# Patient Record
Sex: FEMALE | Race: WHITE
[De-identification: names, ages, dates, MRNs, and addresses within clinical notes are randomized per-mention and may not be internally consistent; named-entity substitution may affect disease eponyms.]

---

## 2017-08-17 ENCOUNTER — HOSPITAL ENCOUNTER (OUTPATIENT)
Dept: HOSPITAL 63 - ER | Age: 47
Setting detail: OBSERVATION
LOS: 1 days | Discharge: HOME | End: 2017-08-18
Attending: INTERNAL MEDICINE | Admitting: INTERNAL MEDICINE
Payer: OTHER GOVERNMENT

## 2017-08-17 VITALS — BODY MASS INDEX: 28.57 KG/M2 | HEIGHT: 68 IN | WEIGHT: 188.5 LBS

## 2017-08-17 VITALS — DIASTOLIC BLOOD PRESSURE: 79 MMHG | SYSTOLIC BLOOD PRESSURE: 119 MMHG

## 2017-08-17 VITALS — SYSTOLIC BLOOD PRESSURE: 120 MMHG | DIASTOLIC BLOOD PRESSURE: 74 MMHG

## 2017-08-17 DIAGNOSIS — M54.2: ICD-10-CM

## 2017-08-17 DIAGNOSIS — M79.7: ICD-10-CM

## 2017-08-17 DIAGNOSIS — K58.9: ICD-10-CM

## 2017-08-17 DIAGNOSIS — F32.9: ICD-10-CM

## 2017-08-17 DIAGNOSIS — E66.9: ICD-10-CM

## 2017-08-17 DIAGNOSIS — G44.209: ICD-10-CM

## 2017-08-17 DIAGNOSIS — M54.5: ICD-10-CM

## 2017-08-17 DIAGNOSIS — F43.10: ICD-10-CM

## 2017-08-17 DIAGNOSIS — F41.9: ICD-10-CM

## 2017-08-17 DIAGNOSIS — G89.4: ICD-10-CM

## 2017-08-17 DIAGNOSIS — K21.9: ICD-10-CM

## 2017-08-17 DIAGNOSIS — G43.909: ICD-10-CM

## 2017-08-17 DIAGNOSIS — F17.210: ICD-10-CM

## 2017-08-17 DIAGNOSIS — G40.89: Primary | ICD-10-CM

## 2017-08-17 DIAGNOSIS — Z82.49: ICD-10-CM

## 2017-08-17 LAB
AMPHETAMINE/METHAMPHETAMINE: (no result)
ANION GAP SERPL CALC-SCNC: 16 MMOL/L (ref 6–14)
BARBITURATES UR-MCNC: (no result) UG/ML
BASOPHILS # BLD AUTO: 0.1 X10^3/UL (ref 0–0.2)
BASOPHILS NFR BLD: 1 % (ref 0–3)
BENZODIAZ UR-MCNC: (no result) UG/L
CA-I SERPL ISE-MCNC: 12 MG/DL (ref 7–20)
CALCIUM SERPL-MCNC: 9 MG/DL (ref 8.5–10.1)
CANNABINOIDS UR-MCNC: (no result) UG/L
CHLORIDE SERPL-SCNC: 107 MMOL/L (ref 98–107)
CO2 SERPL-SCNC: 18 MMOL/L (ref 21–32)
COCAINE UR-MCNC: (no result) NG/ML
CREAT SERPL-MCNC: 1.2 MG/DL (ref 0.6–1)
EOSINOPHIL NFR BLD: 0.1 X10^3/UL (ref 0–0.7)
EOSINOPHIL NFR BLD: 1 % (ref 0–3)
ERYTHROCYTE [DISTWIDTH] IN BLOOD BY AUTOMATED COUNT: 14.2 % (ref 11.5–14.5)
GFR SERPLBLD BASED ON 1.73 SQ M-ARVRAT: 48.2 ML/MIN
GLUCOSE SERPL-MCNC: 120 MG/DL (ref 70–99)
HCT VFR BLD CALC: 42.5 % (ref 36–47)
HGB BLD-MCNC: 14.3 G/DL (ref 12–15.5)
LYMPHOCYTES # BLD: 1.6 X10^3/UL (ref 1–4.8)
LYMPHOCYTES NFR BLD AUTO: 16 % (ref 24–48)
MCH RBC QN AUTO: 30 PG (ref 25–35)
MCHC RBC AUTO-ENTMCNC: 34 G/DL (ref 31–37)
MCV RBC AUTO: 90 FL (ref 79–100)
METHADONE SERPL-MCNC: (no result) NG/ML
MONO #: 0.3 X10^3/UL (ref 0–1.1)
MONOCYTES NFR BLD: 3 % (ref 0–9)
NEUT #: 8.3 X10^3UL (ref 1.8–7.7)
NEUTROPHILS NFR BLD AUTO: 80 % (ref 31–73)
OPIATES UR-MCNC: (no result) NG/ML
PCP SERPL-MCNC: (no result) MG/DL
PLATELET # BLD AUTO: 153 X10^3/UL (ref 140–400)
POTASSIUM SERPL-SCNC: 3.6 MMOL/L (ref 3.5–5.1)
PREG TEST PT QUAL: NEGATIVE
RBC # BLD AUTO: 4.71 X10^6/UL (ref 3.5–5.4)
SODIUM SERPL-SCNC: 141 MMOL/L (ref 136–145)
WBC # BLD AUTO: 10.5 X10^3/UL (ref 4–11)

## 2017-08-17 PROCEDURE — 70450 CT HEAD/BRAIN W/O DYE: CPT

## 2017-08-17 PROCEDURE — 80048 BASIC METABOLIC PNL TOTAL CA: CPT

## 2017-08-17 PROCEDURE — 81025 URINE PREGNANCY TEST: CPT

## 2017-08-17 PROCEDURE — 96361 HYDRATE IV INFUSION ADD-ON: CPT

## 2017-08-17 PROCEDURE — G0378 HOSPITAL OBSERVATION PER HR: HCPCS

## 2017-08-17 PROCEDURE — 85027 COMPLETE CBC AUTOMATED: CPT

## 2017-08-17 PROCEDURE — G0479 DRUG TEST PRESUMP NOT OPT: HCPCS

## 2017-08-17 PROCEDURE — 95816 EEG AWAKE AND DROWSY: CPT

## 2017-08-17 PROCEDURE — 72125 CT NECK SPINE W/O DYE: CPT

## 2017-08-17 PROCEDURE — 96374 THER/PROPH/DIAG INJ IV PUSH: CPT

## 2017-08-17 PROCEDURE — 96372 THER/PROPH/DIAG INJ SC/IM: CPT

## 2017-08-17 PROCEDURE — 96375 TX/PRO/DX INJ NEW DRUG ADDON: CPT

## 2017-08-17 PROCEDURE — 85025 COMPLETE CBC W/AUTO DIFF WBC: CPT

## 2017-08-17 PROCEDURE — 93005 ELECTROCARDIOGRAM TRACING: CPT

## 2017-08-17 PROCEDURE — G0379 DIRECT REFER HOSPITAL OBSERV: HCPCS

## 2017-08-17 PROCEDURE — 36415 COLL VENOUS BLD VENIPUNCTURE: CPT

## 2017-08-17 PROCEDURE — 84703 CHORIONIC GONADOTROPIN ASSAY: CPT

## 2017-08-17 PROCEDURE — 80307 DRUG TEST PRSMV CHEM ANLYZR: CPT

## 2017-08-17 RX ADMIN — SODIUM CHLORIDE, PRESERVATIVE FREE PRN ML: 5 INJECTION INTRAVENOUS at 15:02

## 2017-08-17 RX ADMIN — SODIUM CHLORIDE, PRESERVATIVE FREE PRN ML: 5 INJECTION INTRAVENOUS at 20:05

## 2017-08-17 RX ADMIN — SODIUM CHLORIDE SCH MLS/HR: 0.9 INJECTION, SOLUTION INTRAVENOUS at 20:04

## 2017-08-17 NOTE — PHYS DOC
Past History


Past Medical History:  Fibromyalgia, IBS, Migraines


Past Surgical History:  Appendectomy, Cholecystectomy, Hysterectomy, 

Tonsillectomy, Other


Additional Past Surgical Histo:  foot surgery


Smoking:  Non-smoker


Alcohol Use:  None


Drug Use:  None





Adult General


Chief Complaint


Chief Complaint:  seizure-like motor activity





[facility]


HPI





Patient is a pleasant 46-year-old female with history of chronic lower back 

pain significant PTSD, fibromyalgia who is presently under the care of chronic 

pain management clinic doctor Dr. Poole for several years secondary to chronic 

lower back pain and headache. She's been transferred to his care by Dr. Timoteo Quinonez for an evaluation and management of her chronic back pain. Patient is a 

chronic lower back pain radiates bilateral hips and lower legs pain is 

described as sharp stabbing and throbbing pain is moderate to severe. Patient 

says that the pain came on gradually has been worse with bending twisting and 

prolonged sitting. It does affect her sleep in quality didn't see. In the past 

she has failed therapeutic doses and regimens of appropriate gabapentin, 

Cymbalta over-the-counter anti-inflammatories and opiates. Head interim thecal 

injections massage therapy and stretching exercises. Patient also complains of 

headaches which are bilateral and she is taking Topamax for her chronic 

migraines. Patient is failed prescription strength analgesics membranes it was 

a muscle relaxant and NSAIDs. Today she was going to Dr. Quinonez's office for an 

evaluation when she fell this morning after having a questionable seizure after 

the fall. Patient has had total of 10 of these tonic-clonic movement activities 

lasting 60 seconds anywhere from 2 minutes and longer. Patient is not postictal 

she is able to talk through the entire exam she will follow instructions even 

while examining the symptoms. She is GCS 15 after the symptoms seen. The 

prescription is rolling back and forth eyes will be closed and pain would have 

pelvic thrusting as well as her stay with her seizure-like activity. Patient 

denies a prior history of seizure-like activity.





Review of Systems


Review of Systems





Constitutional: Denies fever or chills []


Eyes: Denies change in visual acuity, redness, or eye pain []


HENT: Denies nasal congestion or sore throat []


Respiratory: Denies cough or shortness of breath []


Cardiovascular: No additional information not addressed in HPI []


GI: Denies abdominal pain, nausea, vomiting, bloody stools or diarrhea []


: Denies dysuria or hematuria []


Musculoskeletal: She does have chronic headaches


Integument: Denies rash or skin lesions []


Neurologic: He does have chronic headaches and chronic lower back pain.


Endocrine: Denies polyuria or polydipsia []





Allergies


Allergies





Allergies








Coded Allergies Type Severity Reaction Last Updated Verified


 


  varenicline tartrate Allergy Severe SEIZURES 14 Yes


 


  titanium Allergy Intermediate rash 14 Yes


 


  tizanidine HCl Allergy Intermediate tremors 12/3/13 Yes


 


  atropine Adverse Reaction Severe hypertension   skin turned very red 12/3/13 

Yes











Physical Exam


Physical Exam


Patient's vital signs are 125/86 normotensive patient's saturation is 93% on 

room air is tachycardic at 103


Constitutional: Well developed, well nourished, patient is nondiaphoretic 

although she does hold her breath with the seizure-like activity she is not 

postictal. She is awake alert oriented 4 which he arouses. She was told that 

she is not having a history of seizures during a seizure.


HENT: Normocephalic, atraumatic, bilateral external ears normal, oropharynx 

moist, no oral exudates, nose normal. Oropharynx is clear of any oral lesions. [

]


Eyes: PERRLA, EOMI, conjunctiva normal, no discharge. Patient gives eyes opened 

when asked patient has her eyes closed with seizure activity. [] 


Neck: Normal range of motion, no tenderness, supple, no stridor. [] 


Cardiovascular:Heart rate regular rhythm, no murmur she is tachycardic. []


Lungs & Thorax:  Bilateral breath sounds clear to auscultation []


Abdomen: Bowel sounds normal, soft, no tenderness, no masses, no pulsatile 

masses. [] 


Skin: Warm, dry, no erythema, no rash. [] 


Extremities: No tenderness, no cyanosis, no clubbing, ROM intact, no edema. [] 


Neurologic: Alert and oriented X 3, normal motor function, normal sensory 

function, no focal deficits noted. Patient on her arms are directed above her 

hold in place and when released.


The bedside as opposed to over her head as ankle. Patient able to follow 

instructions during the seizure activity open her mouth, hold her arms up when 

asked.


Psychologic: Patient seems to have a normal affect and normal judgment





Current Patient Data


Lab Results





 Laboratory Tests








Test


  17


14:53 17


15:05 17


15:19


 


White Blood Count


  10.5 x10^3/uL


(4.0-11.0) 


  


 


 


Red Blood Count


  4.71 x10^6/uL


(3.50-5.40) 


  


 


 


Hemoglobin


  14.3 g/dL


(12.0-15.5) 


  


 


 


Hematocrit


  42.5 %


(36.0-47.0) 


  


 


 


Mean Corpuscular Volume


  90 fL ()


  


  


 


 


Mean Corpuscular Hemoglobin 30 pg (25-35)    


 


Mean Corpuscular Hemoglobin


Concent 34 g/dL


(31-37) 


  


 


 


Red Cell Distribution Width


  14.2 %


(11.5-14.5) 


  


 


 


Platelet Count


  153 x10^3/uL


(140-400) 


  


 


 


Neutrophils (%) (Auto) 80 % (31-73)  H  


 


Lymphocytes (%) (Auto) 16 % (24-48)  L  


 


Monocytes (%) (Auto) 3 % (0-9)    


 


Eosinophils (%) (Auto) 1 % (0-3)    


 


Basophils (%) (Auto) 1 % (0-3)    


 


Neutrophils # (Auto)


  8.3 x10^3uL


(1.8-7.7)  H 


  


 


 


Lymphocytes # (Auto)


  1.6 x10^3/uL


(1.0-4.8) 


  


 


 


Monocytes # (Auto)


  0.3 x10^3/uL


(0.0-1.1) 


  


 


 


Eosinophils # (Auto)


  0.1 x10^3/uL


(0.0-0.7) 


  


 


 


Basophils # (Auto)


  0.1 x10^3/uL


(0.0-0.2) 


  


 


 


Sodium Level


  141 mmol/L


(136-145) 


  


 


 


Potassium Level


  3.6 mmol/L


(3.5-5.1) 


  


 


 


Chloride Level


  107 mmol/L


() 


  


 


 


Carbon Dioxide Level


  18 mmol/L


(21-32)  L 


  


 


 


Anion Gap 16 (6-14)  H  


 


Blood Urea Nitrogen


  12 mg/dL


(7-20) 


  


 


 


Creatinine


  1.2 mg/dL


(0.6-1.0)  H 


  


 


 


Estimated GFR


(Cockcroft-Gault) 48.2  


  


  


 


 


Glucose Level


  120 mg/dL


(70-99)  H 


  


 


 


Calcium Level


  9.0 mg/dL


(8.5-10.1) 


  


 


 


Serum Pregnancy Test,


Qualitative Negative (NEG)


  


  


 


 


Urine Opiates Screen  Neg (NEG)   


 


Urine Methadone Screen  Neg (NEG)   


 


Urine Barbiturates  Neg (NEG)   


 


Urine Phencyclidine Screen  Neg (NEG)   


 


Urine


Amphetamine/Methamphetamine 


  Neg (NEG)  


  


 


 


Urine Benzodiazepines Screen  Pos (NEG)   


 


Urine Cocaine Screen  Neg (NEG)   


 


Urine Cannabinoids Screen  Neg (NEG)   


 


Urine Ethyl Alcohol  Neg (NEG)   


 


POC Urine HCG, Qualitative


  


  


  hcg negative


(Negative)











EKG


EKG


KG time 3:35 PM read by Dr. Larson 2017 on patient heart rate is normal 

sinus rhythm with a heart rate of 90. Over 196 with borderline first-degree AV 

block. Patient is a left axis deviation with no ST-T wave T-wave changes 

consistent with ischemia. Patient is a QTC of 447. []





Radiology/Procedures


Radiology/Procedures


[] [facility] [address], [place] [address]


 [phone]


 


 IMAGING REPORT





 Signed





PATIENT: MARIA A RANGEL ACCOUNT: [id number] MRN#: [id number]


: 1970 LOCATION: ER AGE: 46


SEX: F EXAM DT: 17 ACCESSION#: [id number]


STATUS: REG ER ORD. PHYSICIAN: NEDA LARSON MD 


REASON: fall


PROCEDURE: CT HEAD AND CERVICAL SPINE WO





CT of the head without contrast, 2017:





History: Fall, syncope, head trauma





Comparison is made to a study from 10/8/2013. The ventricles are within normal


limits in size. There is no shift of the midline structures. There is no


evidence of acute intracranial hemorrhage or mass effect.





IMPRESSION: No acute intracranial abnormality is detected.











CT of the cervical spine without contrast, 2017:





Noncontrast scans were obtained with multiplanar reconstructions produced. No


acute fracture or dislocation is identified. There are incompletely ossified


anterior spurs at C4-5 and C5-6. There are mild sclerotic changes involving


scattered facet joints bilaterally. There are minimal posterior spurs and


slight disc bulges. No significant spinal canal narrowing is evident.








IMPRESSION:


1. Mild scattered degenerative changes.


2. No acute bony abnormality is detected.

















PQRS Compliance Statement:





One or more of the following individualized dose reduction techniques were


utilized for this examination:


1. Automated exposure control


2. Adjustment of the mA and/or kV according to patient size


3. Use of iterative reconstruction technique

















DICTATED AND SIGNED BY:     MORITZ,RICK S MD


DATE:     17 1529





CC: NEDA LARSON MD; MARY PINO MD ~





Course & Med Decision Making


Course & Med Decision Making


Pertinent Labs and Imaging studies reviewed. (See chart for details)\


I reviewed nursing notes, notes from clinic's from her primary care physician 

EMS notes and history provided by various parties at the bedside.


My seizure differential.





Acute ischemic or hemorrhagic stroke, particularly lobar hemorrhage





subdural hematoma


Subarachnoid hemorrhage 


subarachnoid hemorrhage


Traumatic brain injury


Hypoxic-ischemic injury


Brain abscess


Meningitis or encephalitis 


Acute symptomatic seizures may also be caused by an acute medical illness, 

metabolic disturbance, substance ingestion or withdrawal, and medication 

exposure 


 


Hypoglycemia


Hyperglycemia Nonketotic hyperglycemia 


Hyponatremia 


Hypocalcemia





Hypomagnesemia


Uremia secondary to renal failure


Hyperthyroidism 


Acute intermittent porphyria (AIP) 


Drug intoxication, poisoning, and overdose `` Cocaine, amphetamines, and other 

illicit substances may cause seizures after acute intoxication. Prescribed 

medications that may lower the seizure threshold or cause seizures in overdose 

are listed in the table 








Cerebrovascular disease 





Primary or metastatic brain tumors


Vascular malformations 


Prior central nervous system infection, such as neurocysticercosis





Head injury (see "Post-traumatic seizures and epilepsy")


Neurodegenerative dementia, including Alzheimer disease





DIFFERENTIAL DIAGNOSIS  Seizure is primarily a clinical diagnosis, and 

accurate diagnosis requires differentiating seizure from other common clinical 

events that can mimic seizure.





In adults, the primary conditions to consider in patients presenting with 

transient or paroxysmal neurologic events





Syncope


Transient ischemic attack (particularly in older adults)


Migraine


Panic attack and anxiety


Psychogenic nonepileptic seizure


Transient global amnesia (rare before the age of 50 years)


Narcolepsy with cataplexy


Paroxysmal movement disorders











Patient is a pleasant 46 year old female with chronic headaches, migraine and 

back pain presents with a question will seizure-like activity. During her last 

several hours patient has had 10 episodes of seizure-like activity lasting 

anywhere from a 1-3 minutes. During those periods patient has purposeful 

movement, she is able answer questions, she is able to follow instructions, and 

she does not have any signs of postictal state when she arrived a weeks from 

the symptoms. Patient has no oral trauma. She has a headache like her prior 

migraines is not worse of life and sudden onset. Patient has never had activity 

like this before. She received benzodiazepines and route by EMS and was given 

Haldol and Ativan here and now her seizure activity and anxiety completely 

resolved. Her CT head and neck are unremarkable for any occult fracture, bleed, 

mass lesion in her brain.





Patient has a normal neuro exam, her pupils were examined normal patient's 

cranial nerves are intact. Patient was able to interact appropriately even 

during activity. Blood work is unremarkable nursing notes reviewed as well as 

vital signs. At this point her tachycardia is improved given her decreased 

physical activity. Patient will be admitted to the hospital under her primary 

care doctor's service.





Consultant note: Paged on-call primary care physician





Consultant called at of the service service: 3:55 PM


Consult called back at 3:55 PM





Discussed the case I presented and they agreed with admission. Time of 

acceptance 35 5 PM he did ask me to consult neurology and set up for an EEG.





I spent approximately 45-50 minutes working and engaged directly in the patient 

care providing critical care evaluation this includes but not limited to time 

spent engaged in work directly related to the individual patients care. I spent 

time at the bedside, reviewing test results, discussing the case with staff, 

documenting the medical record and time spent with EMS discussing specific 

treatment issues when the patient presented and during his evaluation.although 

most was spent time documenting patient's seemingly on symptoms that did not 

correlate well with a seizure disorder.





Dragon Disclaimer


Dragon Disclaimer


This chart was dictated in whole or in part using Voice Recognition software in 

a busy, high-work load, and often noisy Emergency Department environment.  It 

may contain unintended and wholly unrecognized errors or omissions.





Departure


Departure:


Impression:  


 Primary Impression:  


 Migraine headache


 Additional Impression:  


 Pseudoseizures


Disposition:  09 ADMITTED AS INPATIENT


Admitting Physician:  [place]


Condition:  GUARDED


Referrals:  


MARY PINO MD (PCP)





Problem Qualifiers











NEDA LARSON MD Aug 17, 2017 14:54

## 2017-08-17 NOTE — EKG
[facility] [address], [place] [address]

Test Date:    2017               Test Time:    15:35:40

Pat Name:     MARIA A RANGEL              Department:   

Patient ID:   [id number]           Room:          

Gender:       F                        Technician:   

:          1970               Requested By: NEDA LARSON

Order Number: [id number]            Reading MD:     

                                 Measurements

Intervals                              Axis          

Rate:         90                       P:            -30

NM:           196                      QRS:          -4

QRSD:         78                       T:            25

QT:           362                                    

QTc:          447                                    

                           Interpretive Statements

SINUS RHYTHM

LEFTWARD AXIS

QRS(T) CONTOUR ABNORMALITY

CONSIDER ANTEROSEPTAL MYOCARDIAL DAMAGE

RI6.01          Unconfirmed report

No previous ECG available for comparison

## 2017-08-17 NOTE — RAD
CT of the head without contrast, 8/17/2017:



History: Fall, syncope, head trauma



Comparison is made to a study from 10/8/2013. The ventricles are within normal

limits in size. There is no shift of the midline structures. There is no

evidence of acute intracranial hemorrhage or mass effect.



IMPRESSION: No acute intracranial abnormality is detected.







CT of the cervical spine without contrast, 8/17/2017:



Noncontrast scans were obtained with multiplanar reconstructions produced. No

acute fracture or dislocation is identified. There are incompletely ossified

anterior spurs at C4-5 and C5-6. There are mild sclerotic changes involving

scattered facet joints bilaterally. There are minimal posterior spurs and

slight disc bulges. No significant spinal canal narrowing is evident.





IMPRESSION:

1. Mild scattered degenerative changes.

2. No acute bony abnormality is detected.











PQRS Compliance Statement:



One or more of the following individualized dose reduction techniques were

utilized for this examination:

1. Automated exposure control

2. Adjustment of the mA and/or kV according to patient size

3. Use of iterative reconstruction technique

## 2017-08-18 VITALS
DIASTOLIC BLOOD PRESSURE: 73 MMHG | SYSTOLIC BLOOD PRESSURE: 107 MMHG | SYSTOLIC BLOOD PRESSURE: 107 MMHG | DIASTOLIC BLOOD PRESSURE: 73 MMHG

## 2017-08-18 VITALS — DIASTOLIC BLOOD PRESSURE: 83 MMHG | SYSTOLIC BLOOD PRESSURE: 129 MMHG

## 2017-08-18 VITALS — DIASTOLIC BLOOD PRESSURE: 81 MMHG | SYSTOLIC BLOOD PRESSURE: 127 MMHG

## 2017-08-18 LAB
ANION GAP SERPL CALC-SCNC: 6 MMOL/L (ref 6–14)
CA-I SERPL ISE-MCNC: 10 MG/DL (ref 7–20)
CALCIUM SERPL-MCNC: 8.3 MG/DL (ref 8.5–10.1)
CHLORIDE SERPL-SCNC: 111 MMOL/L (ref 98–107)
CO2 SERPL-SCNC: 24 MMOL/L (ref 21–32)
CREAT SERPL-MCNC: 0.8 MG/DL (ref 0.6–1)
ERYTHROCYTE [DISTWIDTH] IN BLOOD BY AUTOMATED COUNT: 14 % (ref 11.5–14.5)
GFR SERPLBLD BASED ON 1.73 SQ M-ARVRAT: 76.9 ML/MIN
GLUCOSE SERPL-MCNC: 83 MG/DL (ref 70–99)
HCT VFR BLD CALC: 36.8 % (ref 36–47)
HGB BLD-MCNC: 12.2 G/DL (ref 12–15.5)
MCH RBC QN AUTO: 30 PG (ref 25–35)
MCHC RBC AUTO-ENTMCNC: 33 G/DL (ref 31–37)
MCV RBC AUTO: 90 FL (ref 79–100)
PLATELET # BLD AUTO: 135 X10^3/UL (ref 140–400)
POTASSIUM SERPL-SCNC: 3.6 MMOL/L (ref 3.5–5.1)
RBC # BLD AUTO: 4.1 X10^6/UL (ref 3.5–5.4)
SODIUM SERPL-SCNC: 141 MMOL/L (ref 136–145)
WBC # BLD AUTO: 8.7 X10^3/UL (ref 4–11)

## 2017-08-18 RX ADMIN — SODIUM CHLORIDE SCH MLS/HR: 0.9 INJECTION, SOLUTION INTRAVENOUS at 12:14

## 2017-08-18 RX ADMIN — SODIUM CHLORIDE SCH MLS/HR: 0.9 INJECTION, SOLUTION INTRAVENOUS at 05:18

## 2017-08-18 NOTE — SSS
ADMIT DATE:  08/18/2017



HISTORY OF PRESENT ILLNESS:  The patient is a 46-year-old  female

patient with history of chronic low back pain, significant posttraumatic stress

disorder, fibromyalgia.  The patient stated that she has chronic low back pain

that radiates bilaterally to both hips and lower legs that is described as

sharp, stabbing, and throbbing, moderate to severe.  The pain came on gradually

and getting worse with bending, twisting, and prolonged sitting.  She apparently

has failed treatment with ____ gabapentin, Cymbalta, and over-the-counter

anti-inflammatory medication as well as opiates.  She has had intrathecal

injection, massage therapy, and stretching exercises.  She also complained of

headache, which are bilateral and she is taking Topamax for her chronic

migraine.  The patient states she apparently has failed prescription strength

analgesics as well as muscle relaxant, nonsteroidal anti-inflammatory medication

and when she was going to Dr. Quinonez's office for an evaluation where she fell

after having questionable seizure after the fall, the patient has had total of

10 of these tonic-clonic movement activities lasting 6 seconds anywhere from 2

minutes and longer.  The patient is not postictal.  She is able talk to through

the entire exam.  She will follow instructions even while examining systems. 

Her Superior coma scale was 15 after the symptoms seen.  The description is that

she is rolling back and forth her eyes, will be closed.  There will be some

pelvic ____ as well as what seemed to be a tonic-clonic seizure, although the

patient denied any previous history of seizure-like activity.  She was evaluated

in the Emergency Room and her chemistry was mostly normal as well as her blood

count.  Her urinalysis was negative and her urine toxic screen was positive for

benzodiazepine.  She has had head and cervical spine CT, which showed that there

is no acute intracranial abnormality detected and mild scattered degenerative

changes, no acute bony abnormalities detected in her cervical spine.  She was

admitted to [address] [place] [address], was evaluated by Dr. Garcia, who arranged for her to have an

EEG and recommended that she can be discharged to be followed as an outpatient.



PAST MEDICAL HISTORY:  Significant for fibromyalgia, irritable bowel syndrome,

migraine headache, and chronic pain syndrome.



PAST SURGICAL HISTORY:  Significant for appendectomy, cholecystectomy,

hysterectomy, tonsillectomy as well as foot surgery.



SOCIAL HISTORY:  She is , lives with her .  She does not smoke,

does not drink alcohol or use any recreational drugs.



REVIEW OF SYSTEMS:  As per history of present illness.



PHYSICAL EXAMINATION:

GENERAL:  On examining her, she looked well and was clearly in no apparent

respiratory distress.  There were no pallor, jaundice, cyanosis,

lymphadenopathy, or thyromegaly.  No jugular venous distention.  No lower limb

edema.

VITAL SIGNS:  Her heart rate was 78, blood pressure was 119/79, temperature was

98.3, respiratory rate was 18, and oxygen saturation was 97%.

HEAD, EYES, EARS, NOSE, AND THROAT:  Normocephalic, atraumatic.

NECK:  Supple.

HEART:  Showed normal first and second heart sounds with no gallop, rub, or

murmur.

CHEST:  Clear to auscultation.  No crepitation or rhonchi.

ABDOMEN:  Nondistended, soft, nontender.

NEUROLOGIC:  She is awake, alert, responding appropriately.  Cranial nerves

intact.  She moves extremities without difficulty.  She ambulates without

assistance or assistive devices.



LABORATORY DATA:  Showed a white cell count of 8,700, hemoglobin 12, hematocrit

36, MCV 90, and platelet count of 135,000.  Her chemistry showed serum sodium

141, potassium 3.6, chloride 111, bicarbonate 24, anion gap of 6, BUN 10,

creatinine 0.8, estimated GFR was 77 mL per minute, her glucose was 83 and

calcium was 8.3.  Urinalysis was negative for urine pregnancy test and her toxic

screen was positive for benzodiazepine.



MEDICATIONS:  She is currently on following medications:  Celebrex 200 mg once a

day, gabapentin 600 mg p.o. daily, gabapentin 1200 mg at bedtime, rizatriptan 5

mg 1 tablet p.o. daily p.r.n. for migraine headache, Topamax 200 mg once a day,

topiramate 400 mg at bedtime, verapamil 120 mg p.o. at bedtime.



The patient was discharged home to continue all her medications and follow up

with her primary care physician as well as Dr. Garcia as an outpatient.



FINAL DISCHARGE DIAGNOSES:  Pseudoseizures, migraine headache, fibromyalgia,

irritable bowel syndrome .





______________________________

EDGAR URENA MD



DR:  KENDALL/maggy  JOB#:  [id number] / [id number]

DD:  08/18/2017 14:54  DT:  08/18/2017 18:53
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
## 2017-12-07 ENCOUNTER — HOSPITAL ENCOUNTER (OUTPATIENT)
Dept: HOSPITAL 63 - RAD | Age: 47
Discharge: HOME | End: 2017-12-07
Attending: PHYSICIAN ASSISTANT
Payer: COMMERCIAL

## 2017-12-07 DIAGNOSIS — S13.9XXA: Primary | ICD-10-CM

## 2017-12-07 DIAGNOSIS — M47.892: ICD-10-CM
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
## 2017-12-07 DIAGNOSIS — Z87.891: ICD-10-CM

## 2017-12-07 DIAGNOSIS — M53.82: ICD-10-CM
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
## 2017-12-07 PROCEDURE — 72040 X-RAY EXAM NECK SPINE 2-3 VW: CPT

## 2017-12-07 NOTE — RAD
Three-view C-spine



History: Neck sprain



AP lateral and open mouth views were obtained



C1-C2 relationship is normal. There is mild reversal of the normal cervical

lordosis. There is no loss or to a stretcher. There is mild soft tissue

prominence over the C5-C6 level. There is marginal spurring of the endplates

at C4-C5 and C5-C6.



 Impression:



1. Degenerative changes at C4-C5 and C5-C6.



2. Mild soft tissue prominence of the prevertebral space is of uncertain

significance. Clinical correlation is suggested.

## 2017-12-29 ENCOUNTER — HOSPITAL ENCOUNTER (EMERGENCY)
Dept: HOSPITAL 63 - ER | Age: 47
Discharge: HOME | End: 2017-12-29
Payer: COMMERCIAL

## 2017-12-29 VITALS — DIASTOLIC BLOOD PRESSURE: 80 MMHG | SYSTOLIC BLOOD PRESSURE: 140 MMHG

## 2017-12-29 DIAGNOSIS — K58.9: ICD-10-CM

## 2017-12-29 DIAGNOSIS — M79.7: ICD-10-CM

## 2017-12-29 DIAGNOSIS — S39.012A: ICD-10-CM
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
## 2017-12-29 DIAGNOSIS — I10: ICD-10-CM

## 2017-12-29 DIAGNOSIS — G43.909: ICD-10-CM

## 2017-12-29 DIAGNOSIS — S49.91XA: Primary | ICD-10-CM

## 2017-12-29 DIAGNOSIS — Z88.8: ICD-10-CM
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
## 2017-12-29 PROCEDURE — 99284 EMERGENCY DEPT VISIT MOD MDM: CPT

## 2017-12-29 PROCEDURE — 73030 X-RAY EXAM OF SHOULDER: CPT

## 2017-12-29 PROCEDURE — 96372 THER/PROPH/DIAG INJ SC/IM: CPT

## 2017-12-29 NOTE — RAD
Right shoulder, 3 views, 12/29/2017:



History: Fall, shoulder pain



No fracture or dislocation is identified. There is only minimal arthritic

change at the acromioclavicular and glenohumeral joints.



IMPRESSION: No acute bony abnormality is detected.

## 2017-12-29 NOTE — PHYS DOC
Past History


Past Medical History:  Fibromyalgia, Hypertension, IBS, Migraines, Other


Past Surgical History:  Appendectomy, Cholecystectomy, Hysterectomy, 

Tonsillectomy, Other


Additional Past Surgical Histo:  foot surgery


Smoking:  Non-smoker


Alcohol Use:  None


Drug Use:  None





Adult General


Chief Complaint


Chief Complaint:  MECHANICAL FALL





HPI


HPI





Patient is a 47-year-old female who was going down the stairs and lost her step 

and when trying to regain her balance and reached over with her right upper 

extremity to hold onto the rail which caused her arm being pulled. She presents 

with complaints of right shoulder pain as well to right lower back pain. 

Patient has no other complaints, denies any other injuries.





Review of Systems


Review of Systems





Constitutional: Denies fever or chills []


Eyes: Denies injury


HENT: Denies injury


Respiratory: Denies cough or shortness of breath []


Cardiovascular: No chest pain


GI: Denies abdominal pain or injury


aturia []


Musculoskeletal: As per history of present illness[]


Integument: Denies rash or skin lesions []


Neurologic: Denies headache, focal weakness or sensory changes []








All other systems were reviewed and found to be within normal limits, except as 

documented in this note.





Current Medications


Current Medications





Current Medications








 Medications


  (Trade)  Dose


 Ordered  Sig/Jad  Start Time


 Stop Time Status Last Admin


Dose Admin


 


 Ketorolac


 Tromethamine


  (Toradol)  30 mg  1X  ONCE  12/29/17 11:15


 12/29/17 11:16 UNV  


 











Allergies


Allergies





Allergies








Coded Allergies Type Severity Reaction Last Updated Verified


 


  varenicline tartrate Allergy Severe SEIZURES 7/27/14 Yes


 


  titanium Allergy Intermediate rash 7/27/14 Yes


 


  tizanidine HCl Allergy Intermediate tremors 12/3/13 Yes


 


  atropine Adverse Reaction Severe hypertension   skin turned very red 12/3/13 

Yes











Physical Exam


Physical Exam





Constitutional: Well developed, well nourished, no acute distress, non-toxic 

appearance. []


HENT: Normocephalic, atraumatic, no signs of basilar skull fracture


Eyes: EOMI, conjunctiva normal, no discharge. [] 


Neck: Normal range of motion, trachea midline, no stridor. No midline 

tenderness to palpation, no step-offs


Cardiovascular:Heart rate regular rhythm, no murmur, equal pulses, normal 

perfusion


Lungs & Thorax:  Bilateral breath sounds clear to auscultation, no tachypnea


Abdomen: No distention


Skin: Warm, dry, no erythema, no rash. No bruising, no ecchymosis


Back: No tenderness, no CVA tenderness. No midline tenderness to palpation, no 

step-offs.  Old well healed scar


Extremities: No tenderness, no deformity, ROM intact, no edema. No evidence of 

dislocation


Neurologic: Alert and oriented X 3, normal motor function, and relates in the 

ED with normal gait and without assistance, no focal deficits noted. []


Psychologic: Affect normal, judgement normal, mood normal. []





EKG


EKG


[]





Radiology/Procedures


Radiology/Procedures


shoulder Xray: no acute findings[]





Course & Med Decision Making


Course & Med Decision Making


Pertinent Labs and Imaging studies reviewed. (See chart for details)





[]





Dragon Disclaimer


Dragon Disclaimer


This electronic medical record was generated, in whole or in part, using a 

voice recognition dictation system.





Departure


Departure:


Impression:  


 Primary Impression:  


 Shoulder injury


 Additional Impressions:  


 Contusion


 Muscle strain


Condition:  LEFT WITHOUT BEING SEEN


Referrals:  


RASHMI JUÁREZ (PCP)


Please follow-up with your PCP for recheck and reevaluation in 2-4 days


Patient Instructions:  Contusion, Cryotherapy, Easy-to-Read, Heat Therapy, 

Muscle Strain


Scripts


Lidocaine HCl/Menthol (Icy Hot 4%-1% Cream) 76.5 Gm Cream..g.


76.5 GM TP TID for 4 Days, EACH


   Prov: AYAKA THOMPSON MD         12/29/17 


Naproxen (NAPROXEN) 375 Mg Tablet


1 TAB PO BID for 4 Days, #8 TAB 5 Refills


   Prov: AYAKA THOMPSON MD         12/29/17





Problem Qualifiers











AYAKA THOMPSON MD Dec 29, 2017 11:09

## 2018-05-29 ENCOUNTER — HOSPITAL ENCOUNTER (OUTPATIENT)
Dept: HOSPITAL 61 - SURG | Age: 48
Discharge: HOME | End: 2018-05-29
Attending: SURGERY
Payer: COMMERCIAL

## 2018-05-29 VITALS — SYSTOLIC BLOOD PRESSURE: 135 MMHG | DIASTOLIC BLOOD PRESSURE: 70 MMHG

## 2018-05-29 DIAGNOSIS — K64.8: ICD-10-CM

## 2018-05-29 DIAGNOSIS — Z90.721: ICD-10-CM

## 2018-05-29 DIAGNOSIS — M19.90: ICD-10-CM

## 2018-05-29 DIAGNOSIS — F17.200: ICD-10-CM

## 2018-05-29 DIAGNOSIS — Z98.890: ICD-10-CM

## 2018-05-29 DIAGNOSIS — Z90.710: ICD-10-CM

## 2018-05-29 DIAGNOSIS — I10: ICD-10-CM

## 2018-05-29 DIAGNOSIS — Z98.51: ICD-10-CM

## 2018-05-29 DIAGNOSIS — F32.9: ICD-10-CM

## 2018-05-29 DIAGNOSIS — Z90.49: ICD-10-CM

## 2018-05-29 DIAGNOSIS — K64.4: Primary | ICD-10-CM

## 2018-05-29 DIAGNOSIS — F41.9: ICD-10-CM

## 2018-05-29 PROCEDURE — 46260 REMOVE IN/EX HEM GROUPS 2+: CPT

## 2018-05-29 PROCEDURE — 88304 TISSUE EXAM BY PATHOLOGIST: CPT

## 2018-05-29 RX ADMIN — LIDOCAINE HYDROCHLORIDE 1 ML: 10 INJECTION, SOLUTION EPIDURAL; INFILTRATION; INTRACAUDAL; PERINEURAL at 13:24

## 2018-05-29 RX ADMIN — BACITRACIN 1 APP: 500 OINTMENT TOPICAL at 12:24

## 2018-05-29 RX ADMIN — OXYCODONE HYDROCHLORIDE AND ACETAMINOPHEN 1 TAB: 5; 325 TABLET ORAL at 13:32

## 2018-05-29 RX ADMIN — FENTANYL CITRATE 1 MCG: 50 INJECTION INTRAMUSCULAR; INTRAVENOUS at 13:25

## 2018-05-29 RX ADMIN — FENTANYL CITRATE 1 MCG: 50 INJECTION INTRAMUSCULAR; INTRAVENOUS at 13:15

## 2018-05-29 RX ADMIN — BUPIVACAINE HYDROCHLORIDE AND EPINEPHRINE BITARTRATE 1 ML: 2.5; .005 INJECTION, SOLUTION INFILTRATION; PERINEURAL at 12:17

## 2018-05-29 RX ADMIN — FENTANYL CITRATE 1 MCG: 50 INJECTION INTRAMUSCULAR; INTRAVENOUS at 13:04

## 2018-05-29 RX ADMIN — SODIUM CHLORIDE, SODIUM LACTATE, POTASSIUM CHLORIDE, AND CALCIUM CHLORIDE 1 MLS/HR: .6; .31; .03; .02 INJECTION, SOLUTION INTRAVENOUS at 10:55

## 2018-07-09 ENCOUNTER — HOSPITAL ENCOUNTER (EMERGENCY)
Dept: HOSPITAL 63 - ER | Age: 48
Discharge: HOME | End: 2018-07-09
Payer: COMMERCIAL

## 2018-07-09 VITALS
SYSTOLIC BLOOD PRESSURE: 154 MMHG | DIASTOLIC BLOOD PRESSURE: 65 MMHG | SYSTOLIC BLOOD PRESSURE: 154 MMHG | SYSTOLIC BLOOD PRESSURE: 154 MMHG | DIASTOLIC BLOOD PRESSURE: 65 MMHG | DIASTOLIC BLOOD PRESSURE: 65 MMHG

## 2018-07-09 DIAGNOSIS — G43.909: ICD-10-CM

## 2018-07-09 DIAGNOSIS — M79.7: ICD-10-CM

## 2018-07-09 DIAGNOSIS — K58.9: ICD-10-CM

## 2018-07-09 DIAGNOSIS — I10: ICD-10-CM

## 2018-07-09 DIAGNOSIS — Z88.8: ICD-10-CM

## 2018-07-09 DIAGNOSIS — J45.909: Primary | ICD-10-CM

## 2018-07-09 LAB
ANION GAP SERPL CALC-SCNC: 8 MMOL/L (ref 6–14)
APTT PPP: (no result) S
BACTERIA #/AREA URNS HPF: (no result) /HPF
BILIRUB UR QL STRIP: (no result)
CA-I SERPL ISE-MCNC: 17 MG/DL (ref 7–20)
CALCIUM SERPL-MCNC: 9.7 MG/DL (ref 8.5–10.1)
CHLORIDE SERPL-SCNC: 106 MMOL/L (ref 98–107)
CO2 SERPL-SCNC: 28 MMOL/L (ref 21–32)
CREAT SERPL-MCNC: 0.9 MG/DL (ref 0.6–1)
ERYTHROCYTE [DISTWIDTH] IN BLOOD BY AUTOMATED COUNT: 14.5 % (ref 11.5–14.5)
FIBRINOGEN PPP-MCNC: (no result) MG/DL
GFR SERPLBLD BASED ON 1.73 SQ M-ARVRAT: 66.8 ML/MIN
GLUCOSE SERPL-MCNC: 93 MG/DL (ref 70–99)
GLUCOSE UR STRIP-MCNC: (no result) MG/DL
HCT VFR BLD CALC: 44.9 % (ref 36–47)
HGB BLD-MCNC: 15.2 G/DL (ref 12–15.5)
MCH RBC QN AUTO: 29 PG (ref 25–35)
MCHC RBC AUTO-ENTMCNC: 34 G/DL (ref 31–37)
MCV RBC AUTO: 86 FL (ref 79–100)
NITRITE UR QL STRIP: (no result)
PLATELET # BLD AUTO: 178 X10^3/UL (ref 140–400)
POTASSIUM SERPL-SCNC: 3.8 MMOL/L (ref 3.5–5.1)
RBC # BLD AUTO: 5.23 X10^6/UL (ref 3.5–5.4)
RBC #/AREA URNS HPF: (no result) /HPF (ref 0–2)
SODIUM SERPL-SCNC: 142 MMOL/L (ref 136–145)
SP GR UR STRIP: 1.01
SQUAMOUS #/AREA URNS LPF: (no result) /LPF
UROBILINOGEN UR-MCNC: 0.2 MG/DL
WBC # BLD AUTO: 11.7 X10^3/UL (ref 4–11)
WBC #/AREA URNS HPF: (no result) /HPF (ref 0–4)

## 2018-07-09 PROCEDURE — 36415 COLL VENOUS BLD VENIPUNCTURE: CPT

## 2018-07-09 PROCEDURE — 71046 X-RAY EXAM CHEST 2 VIEWS: CPT

## 2018-07-09 PROCEDURE — 96374 THER/PROPH/DIAG INJ IV PUSH: CPT

## 2018-07-09 PROCEDURE — 94644 CONT INHLJ TX 1ST HOUR: CPT

## 2018-07-09 PROCEDURE — 96375 TX/PRO/DX INJ NEW DRUG ADDON: CPT

## 2018-07-09 PROCEDURE — 94640 AIRWAY INHALATION TREATMENT: CPT

## 2018-07-09 PROCEDURE — 84484 ASSAY OF TROPONIN QUANT: CPT

## 2018-07-09 PROCEDURE — 85379 FIBRIN DEGRADATION QUANT: CPT

## 2018-07-09 PROCEDURE — 80048 BASIC METABOLIC PNL TOTAL CA: CPT

## 2018-07-09 PROCEDURE — 99285 EMERGENCY DEPT VISIT HI MDM: CPT

## 2018-07-09 PROCEDURE — 70450 CT HEAD/BRAIN W/O DYE: CPT

## 2018-07-09 PROCEDURE — 81001 URINALYSIS AUTO W/SCOPE: CPT

## 2018-07-09 PROCEDURE — 85027 COMPLETE CBC AUTOMATED: CPT

## 2018-07-09 PROCEDURE — 93005 ELECTROCARDIOGRAM TRACING: CPT

## 2018-07-09 NOTE — EKG
[facility] [address], [place] [address]

Test Date:    2018               Test Time:    13:01:53

Pat Name:     MARIA A RANGEL              Department:   

Patient ID:   [id number]           Room:          

Gender:       F                        Technician:   CARYL

:          [id number]               Requested By: LORENZO MORTON

Order Number: [id number]            Reading MD:   William Wen MD

                                 Measurements

Intervals                              Axis          

Rate:         75                       P:            0

ID:           [id number]                      QRS:          -11

QRSD:         82                       T:            21

QT:           388                                    

QTc:          436                                    

                           Interpretive Statements

SINUS RHYTHM



Electronically Signed On 7- 10:43:03 CDT by William Wen MD

## 2018-07-09 NOTE — RAD
CHEST PA   LATERAL dated 7/9/2018 1:41 PM.

 

Comparison: None.

 

Clinical Indication: Cough, wheezing .

 

Findings:

 

 

 

PA and lateral views of the chest were obtained. Heart and mediastinal 

contours within normal limits. Lungs are clear without focal 

consolidation. Vascular interstitium within normal limits. No pleural 

effusion or pneumothorax.

 

Impression:

 

No acute radiographic abnormality.

 

Electronically signed by: Carlos Wilson MD (7/9/2018 1:48 PM) 

[facility]

## 2018-07-09 NOTE — ED.ADGEN
Past History


Past Medical History:  Fibromyalgia, Hypertension, IBS, Migraines, Other


Past Surgical History:  Appendectomy, Cholecystectomy, Hysterectomy, 

Tonsillectomy, Other


Additional Past Surgical Histo:  foot surgery


Smoking:  Non-smoker


Alcohol Use:  Occasionally


Drug Use:  None





Adult General


HPI


HPI





Patient is a 47 year old female who presents with respiratory symptoms.  Was in 

her physician's office upstairs. She was waiting to be evaluated for some 

respiratory symptoms that she has been having lately. She was sitting in a 

chair when she states she became unconscious. She did not have chest pain or 

shortness of breath. She remembers waking up in the chair with people trying to 

help her. The patient has had a cough isn't productive of yellow-green sputum 

over the last week. She has had some intermittent shortness of breath. She 

noticed today that she was having some wheezing. She does not have a prior 

history of COPD or asthma. She has had chills at home. She has had general 

malaise and body aches. Currently does feel short of breath. Does complain of 

some tightness in her chest and is wheezing.





Review of Systems


Review of Systems





Constitutional: + subjective fever and chills at home


Eyes: Denies change in visual acuity, redness


HENT: + upper airway congestion


Respiratory: + cough and shortness of breath


Cardiovascular: No additional information


GI: Denies abdominal jericho


: Denies dysuria


Musculoskeletal: Denies back pain 


Integument: Denies rash or skin lesions 


Neurologic:  + generalized headache





All other systems were reviewed and found to be within normal limits, except as 

documented in this note.





Current Medications


Current Medications





Current Medications








 Medications


  (Trade)  Dose


 Ordered  Sig/Jad  Start Time


 Stop Time Status Last Admin


Dose Admin


 


 Albuterol Sulfate


  (Ventolin)  10 mg  1X  ONCE  7/9/18 14:15


 7/9/18 14:16 DC 7/9/18 14:14


10 MG


 


 Albuterol/


 Ipratropium


  (Duoneb)  3 ml  1X  ONCE  7/9/18 13:30


 7/9/18 13:31 DC 7/9/18 13:47


3 ML


 


 Diphenhydramine


 HCl


  (Benadryl)  25 mg  1X  ONCE  7/9/18 14:15


 7/9/18 14:16 DC 7/9/18 14:19


25 MG


 


 Prednisone


  (Prednisone)  60 mg  1X  ONCE  7/9/18 14:15


 7/9/18 14:16 DC 7/9/18 14:10


20 MG


 


 Prochlorperazine


 Edisylate


  (Compazine)  10 mg  1X  ONCE  7/9/18 14:15


 7/9/18 14:16 DC 7/9/18 14:19


10 MG


 


 Sodium Chloride  1,000 ml @ 


 1,000 mls/hr  1X  ONCE  7/9/18 13:30


 7/9/18 14:29 DC 7/9/18 14:07


1,000 MLS/HR











Allergies


Allergies





Allergies








Coded Allergies Type Severity Reaction Last Updated Verified


 


  varenicline tartrate Allergy Severe SEIZURES 7/27/14 Yes


 


  titanium Allergy Intermediate rash 7/27/14 Yes


 


  tizanidine HCl Allergy Intermediate tremors 12/3/13 Yes


 


  atropine Adverse Reaction Severe hypertension   skin turned very red 12/3/13 

Yes











Physical Exam


Physical Exam





Constitutional: Well developed, well nourished, no acute distress, non-toxic 

appearance


HENT: Normocephalic, atraumatic, bilateral external ears normal, oropharynx 

moist, no oral exudates, nose normal.


Eyes: PERRLA, EOMI, conjunctiva normal, no discharge 


Neck: Normal range of motion, no tenderness, supple, no stridor 


Cardiovascular:Heart rate regular rhythm, no murmur


Lungs & Thorax:  diminished air movement bilaterally with expiratory wheezes


Abdomen: Bowel sounds normal, soft, no tenderness 


Skin: Warm, dry, no erythema  


Extremities: Normal ROM. No edema 


Neurologic: Alert and oriented X 3, normal motor function, normal sensory 

function, no focal neuro findings on exam


Psychologic: Affect normal





Current Patient Data


Vital Signs





 Vital Signs








  Date Time  Temp Pulse Resp B/P (MAP) Pulse Ox O2 Delivery O2 Flow Rate FiO2


 


7/9/18 14:14     100   


 


7/9/18 13:48      Room Air  


 


7/9/18 13:00 98.8 74 16     








Lab Results





 Laboratory Tests








Test


 7/9/18


13:34


 


White Blood Count


 11.7 x10^3/uL


(4.0-11.0)  H


 


Red Blood Count


 5.23 x10^6/uL


(3.50-5.40)


 


Hemoglobin


 15.2 g/dL


(12.0-15.5)


 


Hematocrit


 44.9 %


(36.0-47.0)


 


Mean Corpuscular Volume


 86 fL ()





 


Mean Corpuscular Hemoglobin 29 pg (25-35)  


 


Mean Corpuscular Hemoglobin


Concent 34 g/dL


(31-37)


 


Red Cell Distribution Width


 14.5 %


(11.5-14.5)


 


Platelet Count


 178 x10^3/uL


(140-400)


 


D-Dimer (Aidee)


 0.48 mg/L


(0.00-0.50)


 


Sodium Level


 142 mmol/L


(136-145)


 


Potassium Level


 3.8 mmol/L


(3.5-5.1)


 


Chloride Level


 106 mmol/L


()


 


Carbon Dioxide Level


 28 mmol/L


(21-32)


 


Anion Gap 8 (6-14)  


 


Blood Urea Nitrogen


 17 mg/dL


(7-20)


 


Creatinine


 0.9 mg/dL


(0.6-1.0)


 


Estimated GFR


(Cockcroft-Gault) 66.8  





 


Glucose Level


 93 mg/dL


(70-99)


 


Calcium Level


 9.7 mg/dL


(8.5-10.1)


 


Troponin I Quantitative


 < 0.017 ng/mL


(0-0.055)











EKG


EKG


NSR. No STEMI





Radiology/Procedures


Radiology/Procedures





Findings: No acute extra-axial or parenchymal hemorrhage is identified. 


There is no significant intra-axial mass effect, midline shift, or 


extra-axial fluid collection. The gray-white differentiation of the major 


vascular territories is preserved.  The ventricles, sulci, and cisterns 


are within normal limits in size and configuration.   The mastoid air 


cells and the visualized paranasal sinuses are aerated.  No acute 


calvarial abnormality is identified.


 


Impression:


1. No acute intracranial abnormality is identified.





Course & Med Decision Making


Course & Med Decision Making


Pertinent Labs and Imaging studies reviewed. (See chart for details)





[]





Final Impression


Final Impression


Bronchitis


Reactive Airway





Dragon Disclaimer


Dragon Disclaimer


This electronic medical record was generated, in whole or in part, using a 

voice recognition dictation system.











LORENZO MORTON DO Jul 9, 2018 15:21

## 2018-07-09 NOTE — RAD
CT HEAD WO CONTRAST

 

History: Syncope, severe headache

 

Comparison: None.

 

Technique: Noncontrast CT imaging was performed of the head.  Exposure: 

One or more of the following individualized dose reduction techniques were

utilized for this examination:  1. Automated exposure control  2. 

Adjustment of the mA and/or kV according to patient size  3. Use of 

iterative reconstruction technique.

 

Findings: No acute extra-axial or parenchymal hemorrhage is identified. 

There is no significant intra-axial mass effect, midline shift, or 

extra-axial fluid collection. The gray-white differentiation of the major 

vascular territories is preserved.  The ventricles, sulci, and cisterns 

are within normal limits in size and configuration.   The mastoid air 

cells and the visualized paranasal sinuses are aerated.  No acute 

calvarial abnormality is identified.

 

Impression:

1. No acute intracranial abnormality is identified.  

 

Electronically signed by: Daniel Paris MD (7/9/2018 3:06 PM) [facility]

## 2018-08-06 ENCOUNTER — HOSPITAL ENCOUNTER (OUTPATIENT)
Dept: HOSPITAL 63 - MAMMO | Age: 48
Discharge: HOME | End: 2018-08-06
Attending: GENERAL PRACTICE
Payer: COMMERCIAL

## 2018-08-06 DIAGNOSIS — E55.9: ICD-10-CM

## 2018-08-06 DIAGNOSIS — G43.909: ICD-10-CM

## 2018-08-06 DIAGNOSIS — I10: ICD-10-CM

## 2018-08-06 DIAGNOSIS — N95.1: ICD-10-CM

## 2018-08-06 DIAGNOSIS — Z87.891: ICD-10-CM

## 2018-08-06 DIAGNOSIS — Z12.31: Primary | ICD-10-CM

## 2018-08-06 DIAGNOSIS — Z90.49: ICD-10-CM

## 2018-08-06 DIAGNOSIS — K21.9: ICD-10-CM

## 2018-08-06 DIAGNOSIS — F32.9: ICD-10-CM

## 2018-08-06 LAB
ESTRADIOL SERPL-MCNC: 7.1 PG/ML
PROGEST SERPL-MCNC: <0.1 NG/ML
TESTOST SERPL-MCNC: 8 NG/DL (ref 8–48)

## 2018-08-06 PROCEDURE — 36415 COLL VENOUS BLD VENIPUNCTURE: CPT

## 2018-08-06 PROCEDURE — 82306 VITAMIN D 25 HYDROXY: CPT

## 2018-08-06 PROCEDURE — 82670 ASSAY OF TOTAL ESTRADIOL: CPT

## 2018-08-06 PROCEDURE — 77067 SCR MAMMO BI INCL CAD: CPT

## 2018-08-06 PROCEDURE — 82626 DEHYDROEPIANDROSTERONE: CPT

## 2018-08-06 PROCEDURE — 77063 BREAST TOMOSYNTHESIS BI: CPT

## 2018-08-06 PROCEDURE — 84144 ASSAY OF PROGESTERONE: CPT

## 2018-08-06 PROCEDURE — 84403 ASSAY OF TOTAL TESTOSTERONE: CPT

## 2018-08-06 PROCEDURE — 82679 ASSAY OF ESTRONE: CPT

## 2018-08-06 NOTE — RAD
DATE: 8/6/2018   



EXAM: MAMMO JANICE SCREENING BILATERAL



HISTORY: Routine screening   



COMPARISON: 3/15/2016   



This study was interpreted with the benefit of Computerized Aided Detection

(CAD).





The breast parenchyma shows scattered fibroglandular densities. Breast

parenchyma level B.





FINDINGS: 2-D and 3-D tomosynthesis imaging was performed in CC and MLO

projections.  The fibroglandular tissues are heterogeneous.  There is a 6 mm

smooth nodule in the inferomedial aspect of left breast as best seen on CC

janice image #18.  It was not clearly visible on previous mammograms.  No other

new or enlarging breast density is seen.  Scattered benign type calcifications

are present.  No suspicious microcalcifications are evident.



IMPRESSION: Small medial left breast nodule.  Sonographic evaluation is

suggested.  



BI-RADS CATEGORY: 0 INCOMPLETE: NEEDS ADDITIONAL IMAGING EVALUATION AND/OR

PRIOR MAMMOGRAMS FOR COMPARISON.



RECOMMENDED FOLLOW-UP: ADD ADDITIONAL IMAGING



PQRS compliance statement: Patient information was entered into a reminder

system with a target due date     for the next mammogram.



Mammography is a sensitive method for finding small breast cancers, but it

does not detect them all and is not a substitute for careful clinical

examination.  A negative mammogram does not negate a clinically suspicious

finding and should not result in delay in biopsying a clinically suspicious

abnormality.



"Our facility is accredited by the [employer] Mammography

Program."

## 2018-08-08 LAB — DHEA SERPL-MCNC: 243 NG/DL (ref 31–701)

## 2018-09-25 ENCOUNTER — HOSPITAL ENCOUNTER (OUTPATIENT)
Dept: HOSPITAL 63 - US | Age: 48
Discharge: HOME | End: 2018-09-25
Attending: GENERAL PRACTICE
Payer: COMMERCIAL

## 2018-09-25 DIAGNOSIS — E55.9: ICD-10-CM

## 2018-09-25 DIAGNOSIS — I10: ICD-10-CM

## 2018-09-25 DIAGNOSIS — R92.8: Primary | ICD-10-CM

## 2018-09-25 DIAGNOSIS — Z82.49: ICD-10-CM

## 2018-09-25 DIAGNOSIS — Z90.49: ICD-10-CM

## 2018-09-25 DIAGNOSIS — K21.9: ICD-10-CM

## 2018-09-25 DIAGNOSIS — Z87.891: ICD-10-CM

## 2018-09-25 PROCEDURE — 76641 ULTRASOUND BREAST COMPLETE: CPT

## 2018-09-25 NOTE — RAD
Left breast ultrasound, 9/25/2018:



History: Left breast nodule



A targeted ultrasound exam of the inferolateral medial aspect of the right

breast was performed.



At the 8:00 location approximately 3 cm in the nipple there is a small nodule.

 It measures 4 x 3 x 2.5 mm.  It is wider than tall.  There are low level

internal echoes with a possible thin septation.  There is no significant

posterior acoustic shadowing or enhancement.  This probably represents a

complicated cyst.



At the 9:00 location approximately 2 cm in the nipple there is a 4 x 4 x 3.5

mm nearly anechoic structure.  No definite posterior acoustic enhancement or

shadowing is seen.  Its margins are slightly angulated.  No internal

vascularity is seen.  The appearance suggests a cyst.  This probably

corresponds to the mammographic abnormality.



No other abnormality was seen inferomedially.



IMPRESSION: Probably benign left breast findings as described above. 

Follow-up left breast ultrasound and left mammography beginning in 6 months is

suggested to establish stability.



BI-RADS 3-probably benign findings

## 2019-03-04 ENCOUNTER — HOSPITAL ENCOUNTER (EMERGENCY)
Dept: HOSPITAL 63 - ER | Age: 49
Discharge: HOME | End: 2019-03-04
Payer: COMMERCIAL

## 2019-03-04 VITALS
SYSTOLIC BLOOD PRESSURE: 139 MMHG | SYSTOLIC BLOOD PRESSURE: 139 MMHG | DIASTOLIC BLOOD PRESSURE: 84 MMHG | DIASTOLIC BLOOD PRESSURE: 84 MMHG | DIASTOLIC BLOOD PRESSURE: 84 MMHG | SYSTOLIC BLOOD PRESSURE: 139 MMHG

## 2019-03-04 VITALS — HEIGHT: 68 IN | WEIGHT: 213 LBS | BODY MASS INDEX: 32.28 KG/M2

## 2019-03-04 DIAGNOSIS — M54.2: Primary | ICD-10-CM

## 2019-03-04 DIAGNOSIS — K58.9: ICD-10-CM

## 2019-03-04 DIAGNOSIS — G43.909: ICD-10-CM

## 2019-03-04 DIAGNOSIS — G89.29: ICD-10-CM

## 2019-03-04 DIAGNOSIS — M79.7: ICD-10-CM

## 2019-03-04 DIAGNOSIS — I10: ICD-10-CM

## 2019-03-04 DIAGNOSIS — Z88.8: ICD-10-CM

## 2019-03-04 PROCEDURE — 99283 EMERGENCY DEPT VISIT LOW MDM: CPT

## 2019-03-04 PROCEDURE — 96372 THER/PROPH/DIAG INJ SC/IM: CPT

## 2019-03-04 RX ADMIN — MORPHINE SULFATE ONE MG: 10 INJECTION, SOLUTION INTRAMUSCULAR; INTRAVENOUS at 19:01

## 2019-03-04 RX ADMIN — KETOROLAC TROMETHAMINE ONE MG: 30 INJECTION, SOLUTION INTRAMUSCULAR at 19:01

## 2019-03-04 NOTE — ED.ADGEN
Past History


Past Medical History:  Fibromyalgia, Hypertension, IBS, Migraines, Other


Past Surgical History:  Appendectomy, Cholecystectomy, Hysterectomy, 

Tonsillectomy, Other


Additional Past Surgical Histo:  foot surgery


Smoking:  Non-smoker


Alcohol Use:  Occasionally


Drug Use:  None





Adult General


Chief Complaint


Chief Complaint


neck pain





HPI


HPI





3 years old female presented to the emergency department with chronic neck pain 

that got worse in the past 2 days she is mentioned that she is unable to move 

her neck and any direction. Complaining of neck stiffness and pain that 

radiated to the scalp, she is also mentioned tingling in the fifth and fourth 

right side fingers. No weakness. No chest pain no fever no chills no nausea no 

vomiting no diarrhea





Review of Systems


Review of Systems





Constitutional: Denies fever or chills []


Eyes: Denies change in visual acuity, redness, or eye pain []


HENT: Denies nasal congestion or sore throat []


Respiratory: Denies cough or shortness of breath []


Cardiovascular: No additional information not addressed in HPI []


GI: Denies abdominal pain, nausea, vomiting, bloody stools or diarrhea []


: Denies dysuria or hematuria []


Integument: Denies rash or skin lesions []


Neurologic: Denies headache, focal weakness or sensory changes []


Endocrine: Denies polyuria or polydipsia []





All other systems were reviewed and found to be within normal limits, except as 

documented in this note.





Current Medications


Current Medications





Current Medications








 Medications


  (Trade)  Dose


 Ordered  Sig/Jad  Start Time


 Stop Time Status Last Admin


Dose Admin


 


 Ketorolac


 Tromethamine


  (Toradol Im)  60 mg  1X  ONCE  3/4/19 18:15


 3/4/19 18:16 DC 3/4/19 19:01


60 MG


 


 Morphine Sulfate


  (Morphine 10mg


 Syringe)  10 mg  1X  ONCE  3/4/19 18:15


 3/4/19 18:16 DC 3/4/19 19:01


10 MG











Allergies


Allergies





Allergies








Coded Allergies Type Severity Reaction Last Updated Verified


 


  varenicline tartrate Allergy Severe SEIZURES 7/27/14 Yes


 


  titanium Allergy Intermediate rash 7/27/14 Yes


 


  tizanidine HCl Allergy Intermediate tremors 12/3/13 Yes


 


  atropine Adverse Reaction Severe hypertension   skin turned very red 12/3/13 

Yes











Physical Exam


Physical Exam





Constitutional: Well developed, well nourished, no acute distress, non-toxic 

appearance. []


HENT: Normocephalic, atraumatic, bilateral external ears normal, oropharynx 

moist, no oral exudates, nose normal. []


Eyes: PERRLA, EOMI, conjunctiva normal, no discharge. [] 


Neck: Limited range of motion, tender paraspinal tenderness, supple, no 

stridor. [] 


Cardiovascular:Heart rate regular rhythm, no murmur []


Lungs & Thorax:  Bilateral breath sounds clear to auscultation []


Abdomen: Bowel sounds normal, soft, no tenderness, no masses, no pulsatile 

masses. [] 


Skin: Warm, dry, no erythema, no rash. [] 


Back: No tenderness, no CVA tenderness. [] 


Extremities: No tenderness, no cyanosis, no clubbing, ROM intact, no edema. [] 


Neurologic: Alert and oriented X 3, normal motor function, normal sensory 

function, no focal deficits noted. []


Psychologic: Affect normal, judgement normal, mood normal. []





Current Patient Data


Vital Signs





 Vital Signs








  Date Time  Temp Pulse Resp B/P (MAP) Pulse Ox O2 Delivery O2 Flow Rate FiO2


 


3/4/19 19:01   18     


 


3/4/19 18:11 98.1 78   96 Room Air  











EKG


EKG


[]





Radiology/Procedures


Radiology/Procedures


[]





Course & Med Decision Making


Course & Med Decision Making


Patient feels much better she declined CT scan of the neck stated I do not want 

to lay down that we'll make my neck hurts a lot out of follow up with my 

primary care provider


[]





Final Impression


Final Impression


[]


Problems:  


(1) Pain in the neck





Dragon Disclaimer


Dragon Disclaimer


This electronic medical record was generated, in whole or in part, using a 

voice recognition dictation system.











PROSPER CHACKO MD Mar 4, 2019 18:17

## 2019-03-15 ENCOUNTER — HOSPITAL ENCOUNTER (OUTPATIENT)
Dept: HOSPITAL 61 - KCIC MRI | Age: 49
Discharge: HOME | End: 2019-03-15
Attending: PHYSICIAN ASSISTANT
Payer: COMMERCIAL

## 2019-03-15 DIAGNOSIS — M50.222: ICD-10-CM

## 2019-03-15 DIAGNOSIS — M12.88: ICD-10-CM

## 2019-03-15 DIAGNOSIS — Z90.710: ICD-10-CM

## 2019-03-15 DIAGNOSIS — M47.892: Primary | ICD-10-CM

## 2019-03-15 PROCEDURE — 72141 MRI NECK SPINE W/O DYE: CPT

## 2019-03-15 NOTE — [facility]
EXAM: Cervical spine MRI without contrast.

 

HISTORY: Pain.

 

TECHNIQUE: Multiplanar, multisequence magnetic resonance imaging of the 

cervical spine was performed without contrast.

 

COMPARISON: 30/1/2018

 

FINDINGS: There is no significant listhesis. The vertebral wise are normal

in height. There is no suspicious osseous lesion. There is a suspected 

bone island within T1. There are disc bulges at multiple levels. There is 

slight deformation of the cervical spinal cord at multiple levels. No 

central canal stenosis or spinal cord signal abnormality is seen. The 

posterior fossa and skull base are unremarkable.

 

At C2-C3, there is minimal endplate remodeling. There is minimal facet 

arthropathy. There is no stenosis.

 

At C3-C4, there is minimal endplate remodeling. There is minimal right and

mild left facet arthropathy. There is no stenosis.

 

At C4-C5, there is minimal endplate remodeling. There is mild bilateral 

facet arthropathy. There is mild left foraminal stenosis.

 

At C5-C6, there is a shallow broad-based posterior central to right 

paracentral disc protrusion superimposed on minimal endplate remodeling. 

There is minimal right and mild left facet arthropathy. There is minimal 

left foraminal stenosis. There is abutment of the right ventral aspect of 

the spinal cord without significant central canal stenosis.

 

At C6-C7, there is a posterior central to left paracentral disc 

protrusion. There is minimal facet arthropathy. There are incidental small

dilated nerve root sheath cysts within the neural foramina. There is no 

significant stenosis.

 

At C7-T1, there is mild facet arthropathy. There is minimal left foraminal

stenosis.

 

IMPRESSION: Minimal degenerative change within the cervical spine, 

described in detail above. There is suspected mild associated left 

foraminal stenosis at C4-C5 and minimal associated left foraminal stenosis

at C5-C6. These findings are similar compared to the prior study, allowing

for differences in imaging technique.

 

Electronically signed by: Shae Carlson MD (3/15/2019 12:38 PM) 

[id number]

## 2019-03-29 ENCOUNTER — HOSPITAL ENCOUNTER (OUTPATIENT)
Dept: HOSPITAL 63 - RAD | Age: 49
Discharge: HOME | End: 2019-03-29
Attending: NURSE PRACTITIONER
Payer: COMMERCIAL

## 2019-03-29 DIAGNOSIS — M25.511: Primary | ICD-10-CM

## 2019-03-29 PROCEDURE — 73030 X-RAY EXAM OF SHOULDER: CPT

## 2019-03-29 NOTE — RAD
Right shoulder, 3 views, 3/29/2019:

 

HISTORY: Shoulder pain

 

No fracture or dislocation is identified. There is mild subacromial 

spurring. The periarticular soft tissues are unremarkable.

 

IMPRESSION: No acute right shoulder abnormality is detected.

 

Electronically signed by: Rick Moritz, MD (3/29/2019 10:26 AM) [facility]

## 2019-10-30 ENCOUNTER — HOSPITAL ENCOUNTER (OUTPATIENT)
Dept: HOSPITAL 63 - PMG | Age: 49
Discharge: HOME | End: 2019-10-30
Attending: PHYSICIAN ASSISTANT
Payer: COMMERCIAL

## 2019-10-30 DIAGNOSIS — R05: Primary | ICD-10-CM

## 2019-10-30 PROCEDURE — 71046 X-RAY EXAM CHEST 2 VIEWS: CPT

## 2019-10-30 NOTE — RAD
CHEST PA   LATERAL

 

History: Left posterior chest pain.

 

Comparison: July 9, 2018.

 

Findings:

Left basilar consolidation. Normal heart size. No pleural effusion. 

Surgical clips upper abdomen. No pneumothorax.

 

Impression: 

1.  Left lower lobe consolidation, concerning for pneumonia. Recommend 

follow-up to ensure resolution.

 

Electronically signed by: Samuel Bauer DO (10/30/2019 5:02 PM) [facility]

## 2020-01-01 ENCOUNTER — HOSPITAL ENCOUNTER (INPATIENT)
Dept: HOSPITAL 61 - 5 SOUTH | Age: 50
LOS: 8 days | Discharge: HOME | DRG: 394 | End: 2020-01-09
Payer: COMMERCIAL

## 2020-01-01 VITALS — DIASTOLIC BLOOD PRESSURE: 64 MMHG | SYSTOLIC BLOOD PRESSURE: 135 MMHG

## 2020-01-01 VITALS — WEIGHT: 212 LBS | BODY MASS INDEX: 32.13 KG/M2 | HEIGHT: 68 IN

## 2020-01-01 DIAGNOSIS — I10: ICD-10-CM

## 2020-01-01 DIAGNOSIS — Z82.49: ICD-10-CM

## 2020-01-01 DIAGNOSIS — M19.90: ICD-10-CM

## 2020-01-01 DIAGNOSIS — Z90.710: ICD-10-CM

## 2020-01-01 DIAGNOSIS — M79.7: ICD-10-CM

## 2020-01-01 DIAGNOSIS — G62.9: ICD-10-CM

## 2020-01-01 DIAGNOSIS — F41.9: ICD-10-CM

## 2020-01-01 DIAGNOSIS — Z90.49: ICD-10-CM

## 2020-01-01 DIAGNOSIS — Z80.1: ICD-10-CM

## 2020-01-01 DIAGNOSIS — F17.210: ICD-10-CM

## 2020-01-01 DIAGNOSIS — N20.0: ICD-10-CM

## 2020-01-01 DIAGNOSIS — E27.1: ICD-10-CM

## 2020-01-01 DIAGNOSIS — G89.29: ICD-10-CM

## 2020-01-01 DIAGNOSIS — K55.069: Primary | ICD-10-CM

## 2020-01-01 DIAGNOSIS — N28.1: ICD-10-CM

## 2020-01-01 DIAGNOSIS — J32.9: ICD-10-CM

## 2020-01-01 DIAGNOSIS — G43.909: ICD-10-CM

## 2020-01-01 DIAGNOSIS — F32.9: ICD-10-CM

## 2020-01-01 DIAGNOSIS — K59.00: ICD-10-CM

## 2020-01-01 PROCEDURE — 87804 INFLUENZA ASSAY W/OPTIC: CPT

## 2020-01-01 PROCEDURE — 80053 COMPREHEN METABOLIC PANEL: CPT

## 2020-01-01 PROCEDURE — 85027 COMPLETE CBC AUTOMATED: CPT

## 2020-01-01 PROCEDURE — 80048 BASIC METABOLIC PNL TOTAL CA: CPT

## 2020-01-01 PROCEDURE — 83615 LACTATE (LD) (LDH) ENZYME: CPT

## 2020-01-01 PROCEDURE — 83605 ASSAY OF LACTIC ACID: CPT

## 2020-01-01 PROCEDURE — 85025 COMPLETE CBC W/AUTO DIFF WBC: CPT

## 2020-01-01 PROCEDURE — 82533 TOTAL CORTISOL: CPT

## 2020-01-01 PROCEDURE — G0378 HOSPITAL OBSERVATION PER HR: HCPCS

## 2020-01-01 PROCEDURE — 74250 X-RAY XM SM INT 1CNTRST STD: CPT

## 2020-01-01 PROCEDURE — 36415 COLL VENOUS BLD VENIPUNCTURE: CPT

## 2020-01-01 PROCEDURE — 76770 US EXAM ABDO BACK WALL COMP: CPT

## 2020-01-01 PROCEDURE — 86140 C-REACTIVE PROTEIN: CPT

## 2020-01-01 PROCEDURE — 74018 RADEX ABDOMEN 1 VIEW: CPT

## 2020-01-01 PROCEDURE — 85651 RBC SED RATE NONAUTOMATED: CPT

## 2020-01-01 PROCEDURE — 74174 CTA ABD&PLVS W/CONTRAST: CPT

## 2020-01-01 RX ADMIN — HYDROMORPHONE HYDROCHLORIDE PRN MG: 2 INJECTION INTRAMUSCULAR; INTRAVENOUS; SUBCUTANEOUS at 22:16

## 2020-01-02 VITALS — SYSTOLIC BLOOD PRESSURE: 144 MMHG | DIASTOLIC BLOOD PRESSURE: 75 MMHG

## 2020-01-02 VITALS — DIASTOLIC BLOOD PRESSURE: 51 MMHG | SYSTOLIC BLOOD PRESSURE: 98 MMHG

## 2020-01-02 VITALS — DIASTOLIC BLOOD PRESSURE: 72 MMHG | SYSTOLIC BLOOD PRESSURE: 132 MMHG

## 2020-01-02 VITALS — SYSTOLIC BLOOD PRESSURE: 140 MMHG | DIASTOLIC BLOOD PRESSURE: 78 MMHG

## 2020-01-02 VITALS — SYSTOLIC BLOOD PRESSURE: 143 MMHG | DIASTOLIC BLOOD PRESSURE: 75 MMHG

## 2020-01-02 VITALS — DIASTOLIC BLOOD PRESSURE: 71 MMHG | SYSTOLIC BLOOD PRESSURE: 119 MMHG

## 2020-01-02 LAB
ALBUMIN SERPL-MCNC: 3.5 G/DL (ref 3.4–5)
ALBUMIN/GLOB SERPL: 1 {RATIO} (ref 1–1.7)
ALP SERPL-CCNC: 60 U/L (ref 46–116)
ALT SERPL-CCNC: 10 U/L (ref 14–59)
ANION GAP SERPL CALC-SCNC: 5 MMOL/L (ref 6–14)
AST SERPL-CCNC: 8 U/L (ref 15–37)
BASOPHILS # BLD AUTO: 0 X10^3/UL (ref 0–0.2)
BASOPHILS NFR BLD: 0 % (ref 0–3)
BILIRUB SERPL-MCNC: 0.2 MG/DL (ref 0.2–1)
BUN SERPL-MCNC: 18 MG/DL (ref 7–20)
BUN/CREAT SERPL: 23 (ref 6–20)
CALCIUM SERPL-MCNC: 8.8 MG/DL (ref 8.5–10.1)
CHLORIDE SERPL-SCNC: 103 MMOL/L (ref 98–107)
CO2 SERPL-SCNC: 27 MMOL/L (ref 21–32)
CREAT SERPL-MCNC: 0.8 MG/DL (ref 0.6–1)
CRP SERPL-MCNC: 3.5 MG/L (ref 0–3.3)
EOSINOPHIL NFR BLD: 0.1 X10^3/UL (ref 0–0.7)
EOSINOPHIL NFR BLD: 1 % (ref 0–3)
ERYTHROCYTE [DISTWIDTH] IN BLOOD BY AUTOMATED COUNT: 14 % (ref 11.5–14.5)
GFR SERPLBLD BASED ON 1.73 SQ M-ARVRAT: 75.9 ML/MIN
GLOBULIN SER-MCNC: 3.6 G/DL (ref 2.2–3.8)
GLUCOSE SERPL-MCNC: 96 MG/DL (ref 70–99)
HCT VFR BLD CALC: 38.8 % (ref 36–47)
HGB BLD-MCNC: 12.9 G/DL (ref 12–15.5)
LYMPHOCYTES # BLD: 1.9 X10^3/UL (ref 1–4.8)
LYMPHOCYTES NFR BLD AUTO: 23 % (ref 24–48)
MCH RBC QN AUTO: 30 PG (ref 25–35)
MCHC RBC AUTO-ENTMCNC: 33 G/DL (ref 31–37)
MCV RBC AUTO: 89 FL (ref 79–100)
MONO #: 0.5 X10^3/UL (ref 0–1.1)
MONOCYTES NFR BLD: 6 % (ref 0–9)
NEUT #: 5.7 X10^3/UL (ref 1.8–7.7)
NEUTROPHILS NFR BLD AUTO: 70 % (ref 31–73)
PLATELET # BLD AUTO: 173 X10^3/UL (ref 140–400)
POTASSIUM SERPL-SCNC: 3.8 MMOL/L (ref 3.5–5.1)
PROT SERPL-MCNC: 7.1 G/DL (ref 6.4–8.2)
RBC # BLD AUTO: 4.37 X10^6/UL (ref 3.5–5.4)
SODIUM SERPL-SCNC: 135 MMOL/L (ref 136–145)
WBC # BLD AUTO: 8.2 X10^3/UL (ref 4–11)

## 2020-01-02 RX ADMIN — TRAZODONE HYDROCHLORIDE SCH MG: 50 TABLET ORAL at 21:53

## 2020-01-02 RX ADMIN — HYDROMORPHONE HYDROCHLORIDE PRN MG: 2 INJECTION INTRAMUSCULAR; INTRAVENOUS; SUBCUTANEOUS at 20:05

## 2020-01-02 RX ADMIN — HYDROMORPHONE HYDROCHLORIDE PRN MG: 2 INJECTION INTRAMUSCULAR; INTRAVENOUS; SUBCUTANEOUS at 01:52

## 2020-01-02 RX ADMIN — FAMOTIDINE SCH MG: 10 INJECTION, SOLUTION INTRAVENOUS at 21:52

## 2020-01-02 RX ADMIN — HYDROMORPHONE HYDROCHLORIDE PRN MG: 2 INJECTION INTRAMUSCULAR; INTRAVENOUS; SUBCUTANEOUS at 16:31

## 2020-01-02 RX ADMIN — HYDROMORPHONE HYDROCHLORIDE PRN MG: 2 INJECTION INTRAMUSCULAR; INTRAVENOUS; SUBCUTANEOUS at 04:48

## 2020-01-02 RX ADMIN — HYDROMORPHONE HYDROCHLORIDE PRN MG: 2 INJECTION INTRAMUSCULAR; INTRAVENOUS; SUBCUTANEOUS at 08:09

## 2020-01-02 RX ADMIN — HYDROMORPHONE HYDROCHLORIDE PRN MG: 2 INJECTION INTRAMUSCULAR; INTRAVENOUS; SUBCUTANEOUS at 12:53

## 2020-01-02 RX ADMIN — VERAPAMIL HYDROCHLORIDE SCH MG: 120 TABLET, FILM COATED, EXTENDED RELEASE ORAL at 21:53

## 2020-01-02 NOTE — PDOC2
JEWEL OSORIO 1/2/20 1358:


GI CONSULT


Reason For Consult:


Abd pain x 2 months





HPI:


HPI:


48 y/o female from [facility].  Lower/middle abd pain x 2 months.  No precipitating 

events.  At first intermittent, maybe worse after eating sometimes.  Much worse 

for the past few days - constant, stabbing, hurts to move, no appetite w/ some 

nausea.  Normal stool on Monday.  Feels really bloated.





Was previously seen [facility] in [place] 3 times - first had CT and told had 

kidney stones, then was given more pain meds and referred to urologist, then had

x-ray and told pain was unlikely related to kidney stones and told she was 

possibly constipated.  Completed bowel prep without change in symptoms.





Denies reflux/heartburn, vomiting, diarrhea, constipation, melena, and weight 

loss.  Saw red blood in stool a couple weeks ago once.  H/o dysphagia w/ past 

esophageal dilations.  More than one colonoscopy in the past - thinks last done 

at [facility], reportedly never abnormal.


S/p cholecystectomy.  No liver, pancreas, or PUD history.  Occasional NSAID use.





When returned to see w/ Dr. Woodard, she mentioned difficulty walking short 

distances due to leg cramping, also says feet get cold.





PMH:


PMH:


HTN, peripheral neuropathy, anxiety/depression, OA, fibromyalgia, seizures


cholecystectomy, appendectomy, hysterectomy, BSO, breast reduction, 

tonsillectomy, bilateral knee and shoulder arthroscopies, left ankle 

arthroscopy, hemorrhoidectomy





FH:


Family History:  Cancer (brother, father, mother - lung; another brother - p

rostate w/ abnormal genetic testing), Other (mother - PVD)





Social History:


Smoke:  <1 pack per day


ALCOHOL:  rare


Drugs:  None





ROS:





GEN: Denies fevers, chills, sweats


HEENT: Denies blurred vision, sore throat


CV: Denies chest pain


RESP: Denies shortness of air, cough


GI: Per HPI


: Denies hematuria, dysuria


ENDO: Denies weight changes


NEURO: Denies confusion, dizziness


MSK: Denies weakness, joint pain/swelling


SKIN: Denies jaundice, pruritus





Vitals:


Vitals:





                                   Vital Signs








  Date Time  Temp Pulse Resp B/P (MAP) Pulse Ox O2 Delivery O2 Flow Rate FiO2


 


1/2/20 12:53   20  95 Room Air  


 


1/2/20 11:00 97.9 70  119/71 (87)    





 97.9       











Labs:


Labs:





Laboratory Tests








Test


 1/2/20


03:55


 


White Blood Count


 8.2 x10^3/uL


(4.0-11.0)


 


Red Blood Count


 4.37 x10^6/uL


(3.50-5.40)


 


Hemoglobin


 12.9 g/dL


(12.0-15.5)


 


Hematocrit


 38.8 %


(36.0-47.0)


 


Mean Corpuscular Volume 89 fL () 


 


Mean Corpuscular Hemoglobin 30 pg (25-35) 


 


Mean Corpuscular Hemoglobin


Concent 33 g/dL


(31-37)


 


Red Cell Distribution Width


 14.0 %


(11.5-14.5)


 


Platelet Count


 173 x10^3/uL


(140-400)


 


Neutrophils (%) (Auto) 70 % (31-73) 


 


Lymphocytes (%) (Auto) 23 % (24-48) 


 


Monocytes (%) (Auto) 6 % (0-9) 


 


Eosinophils (%) (Auto) 1 % (0-3) 


 


Basophils (%) (Auto) 0 % (0-3) 


 


Neutrophils # (Auto)


 5.7 x10^3/uL


(1.8-7.7)


 


Lymphocytes # (Auto)


 1.9 x10^3/uL


(1.0-4.8)


 


Monocytes # (Auto)


 0.5 x10^3/uL


(0.0-1.1)


 


Eosinophils # (Auto)


 0.1 x10^3/uL


(0.0-0.7)


 


Basophils # (Auto)


 0.0 x10^3/uL


(0.0-0.2)


 


Erythrocyte Sedimentation Rate 50 (0-25) 


 


Sodium Level


 135 mmol/L


(136-145)


 


Potassium Level


 3.8 mmol/L


(3.5-5.1)


 


Chloride Level


 103 mmol/L


()


 


Carbon Dioxide Level


 27 mmol/L


(21-32)


 


Anion Gap 5 (6-14) 


 


Blood Urea Nitrogen


 18 mg/dL


(7-20)


 


Creatinine


 0.8 mg/dL


(0.6-1.0)


 


Estimated GFR


(Cockcroft-Gault) 75.9 





 


BUN/Creatinine Ratio 23 (6-20) 


 


Glucose Level


 96 mg/dL


(70-99)


 


Calcium Level


 8.8 mg/dL


(8.5-10.1)


 


Total Bilirubin


 0.2 mg/dL


(0.2-1.0)


 


Aspartate Amino Transf


(AST/SGOT) 8 U/L (15-37) 





 


Alanine Aminotransferase


(ALT/SGPT) 10 U/L (14-59) 





 


Alkaline Phosphatase


 60 U/L


()


 


C-Reactive Protein,


Quantitative 3.5 mg/L


(0-3.3)


 


Total Protein


 7.1 g/dL


(6.4-8.2)


 


Albumin


 3.5 g/dL


(3.4-5.0)


 


Albumin/Globulin Ratio 1.0 (1.0-1.7) 











Allergies:


Coded Allergies:  


     atropine (Verified  Allergy, Intermediate, Hives, 5/29/18)


     titanium (Verified  Allergy, Intermediate, SKIN ON AREA TURNED BLACK (ORTHO

SX), 1/1/20)


     tizanidine (Verified  Allergy, Intermediate, HIVES -BUT TAKES TIZANIDINE 

PRN & NO PROBLEM, 5/29/18)


     varenicline (Verified  Allergy, Intermediate, SEIZURES, 1/1/20)





Medications:





Current Medications








 Medications


  (Trade)  Dose


 Ordered  Sig/Jad


 Route


 PRN Reason  Start Time


 Stop Time Status Last Admin


Dose Admin


 


 Hydromorphone HCl


  (Dilaudid)  1 mg  PRN Q3HRS  PRN


 IV


 SEVERE PAIN 7-10  1/1/20 21:30


    1/2/20 12:53














Imaging:


Imaging:


CT A/P w/ IV contrast 1/1/20


No acute abnormality.


3mm nonobstructing calculus in inferior pole of right kidney.


Cystic lesion along vaginal cuff (present also in 2012).





PE:





GEN: uncomfortable


HEENT: Atraumatic, PERRL


LUNGS: CTAB


HEART: RRR


ABD: quiet BS, some distention, diffuse discomfort


EXTREMITY: No edema


SKIN: No rashes, no jaundice


NEURO/PSYCH: A & O 3





A/P:


A/P:


Lower abd pain, nausea/decreased appetite, bloating - CT unrevealing


H/o esophageal dilations


CRC screen - UTD


S/p cholecystectomy, multiple abd surgeries


BLE cramping w/ exertion, nephrolithiasis


+tobacco





--


Check SBS and GUERO, also ask for surgery opinion.





SAM WOODARD MD 1/2/20 [id number]:








JEWEL OSORIO          Jan 2, 2020 13:58


SAM WOODARD MD              Jan 2, 2020 14:02

## 2020-01-02 NOTE — PN
DATE:  01/02/2020



SUBJECTIVE:  The patient is resting, slightly propped up in bed, in no apparent

respiratory distress.  On questioning her, she continued to complain of

abdominal distention, abdominal bloating, and pain.  She is tolerating her diet

without any problem.  We did actually order her sed rate and CRP.  Her

sedimentation rate is high at 50 mm per hour and C-reactive protein was slightly

elevated at 3.5 mg/liter.



PHYSICAL EXAMINATION:

GENERAL:  On examining her, she looked well and was clearly in no apparent

respiratory distress.  No pallor, jaundice, cyanosis or thyromegaly.  No jugular

venous distention.  No limb edema.

VITAL SIGNS:  Her heart rate was 88, blood pressure 140/78, temperature 97.8,

respiratory rate was 18, and oxygen saturation was 96% on room air.

HEAD, EYES, EARS, NOSE AND THROAT:  Showed normocephalic, atraumatic.

NECK:  Supple.

CARDIAC:  Normal first and second heart sounds.  No gallop or murmur.

CHEST:  Clear to auscultation.  No crepitation or rhonchi.

ABDOMEN:  Distended, soft, diffusely tender.  No guarding or rigidity.  No

organomegaly.  All hernial orifices are intact.  Bowel sounds normal.

NEUROLOGIC:  She is grossly intact.



LABORATORY DATA:  Her lab work this morning showed a white cell count of 8200,

hemoglobin 13, hematocrit 39, MCV 89, and platelet count of 173,000.  Her

sedimentation rate was 50 mm per hour.  Serum sodium was 135, potassium 3.8,

chloride 103, bicarbonate 27, anion gap of 5, BUN 18, creatinine 0.8, estimated

GFR was 76 mL per minute.  Her glucose was 96, calcium was 8.8.  Total

bilirubin, AST, ALT, alkaline phosphatase were normal.  Total protein was 7.1,

albumin 3.5.  C-reactive protein was 3.5 mg/dL.



ASSESSMENT:  Persistent abdominal pain, abdominal bloating.  This has been going

on for almost 2 months now and worsened over the last few days.  The patient has

multitude of abdominal surgeries.  CT scan was unremarkable except for some

cystic lesion in the vaginal cuff.



PLAN:  My plan is to continue with her current medication and await the input

from the Gastroenterology team.

 



______________________________

EDGAR URENA MD



DR:  KENDALL/maggy  JOB#:  [id number] / [id number]

DD:  01/02/2020 11:26  DT:  01/02/2020 12:59

## 2020-01-02 NOTE — HP
ADMIT DATE:  2020



HISTORY OF PRESENT ILLNESS:  The patient is a 49-year-old  female

patient who presented to the Emergency Room of [facility] complaining

of mid abdominal pain that has been going on for the last 2 months, has gotten a

lot worse over the last 2 days.  She rates her pain as a 10/10.  She did

complain of nausea, but no vomiting.  She denied any diarrhea.  The patient

states her last normal bowel movement was on Monday, which is about for 3 days

ago.  She also denied any chest pain or shortness of breath, had no fever. 

Denied any urinary discomfort.  The pain medication improves the pain, but the

food is at the times aggravated.  She was extensively investigated in the

Emergency Room and has had lab work done, which showed that her white cell

count, hemoglobin, hematocrit and platelets are all within normal range.  Her

chemistry was also unremarkable.  Urinalysis was also unremarkable.  She has had

a CT scan of the abdomen and pelvis, which basically showed that the liver,

spleen and bilateral adrenal glands and pancreas are normal in appearance,

gallbladder surgically absent.  No significant intrahepatic or extrahepatic

biliary ductal dilatation.  The abdominal aorta is normal in course and caliber.

 There are no pathologically enlarged lymph nodes in the abdomen and pelvis. 

There is no abdominal free fluid.  There is no free intraperitoneal air. 

Kidneys enhance symmetrically.  There is a 3 mm calculus in the inferior pole of

the right kidney.  No hydronephrosis.  There is a 10-mm cyst in the interpolar

left kidney.  Urinary bladder is within normal limits given degree of

distention.  There is a cystic lesion, which arises off the vaginal cuff and

measures 2.3 x 2.4 cm.  Small and large bowels are normal in caliber.  No

evidence of bowel obstruction or inflammation.  Appendix not definitely

visualized.  No pericecal inflammatory changes are present.  No suspicious

osseous abnormalities identified and the impression is, there is no acute

abnormality identified in the abdomen and pelvis.  She has 3 mm nonobstructing

calculus identified in the inferior border of the right kidney, cystic lesion

along the vaginal cuff measuring 2.3 x 2.4 cm, this appears more similar

compared to prior examination with similar finding is noted measuring

approximately 1.7 x 1.8.  Given that, there is no abnormality that is obvious, a

decision was made to transfer her to [facility] to consult the

gastroenterologist and perhaps the surgical team to assist with evaluation.



PAST MEDICAL HISTORY:  Significant for hypertension, osteoarthritis, and

migraine headache.



PAST SURGICAL HISTORY:  History is extensive.  She has had tonsillectomy, breast

reduction surgery, back surgery, bilateral shoulder arthroscopic surgery, right

ulnar nerve transposition, appendectomy, cholecystectomy and she has tubal

ligation, right ovary and right salpingectomy, had total abdominal hysterectomy

followed by left ovary and left salpingectomy.  She has bilateral knee

arthroscopic surgery and left foot fusion.  She has also esophageal stricture

dilated 3 times and she underwent esophagogastroduodenoscopy and colonoscopy.



ALLERGIES:  SHE IS ALLERGIC TO ATROPINE, TITANIUM, TIZANIDINE, VARENICLINE

TARTRATE (CHANTIX).



MEDICATIONS:  She is currently on following medications:  She is on albuterol

sulfate 2 puffs every 4 hours, verapamil 120 mg daily, Celebrex 200 mg daily,

meloxicam 7.5 mg daily, naproxen 375 mg, hydrocodone/APAP 15 mL solution every 6

hours, gabapentin 600 mg daily, Topamax 200 mg once a day, rizatriptan one

tablet as needed, prednisone 50 mg daily, Lidoderm patch applied topically 3

times a day as needed.



FAMILY HISTORY:  She has 4 brothers and one sister, older.  Her father  at

age of 57 because of lung cancer and mother  at the age of 82 because of

congestive heart failure.



SOCIAL HISTORY:  She is , has a son and a daughter.  She quit smoking

about 3 months ago.  She used to smoke 1-1/2 to 2 packs a day for more than 15

years.  She does not use any illicit drugs.  She used to be a .



REVIEW OF SYSTEMS:  The patient denies any blurring of vision, cataract,

glaucoma or macular degeneration.  Denied any earache, tinnitus or sensorineural

deafness.  Denied any nosebleeds, stuffy nose or postnasal drip.  Denied any

sore throat, sore tongue.  Has nausea, but no vomiting.  Did complain of

dysphagia and required dilatation versus esophageal stricture 3 times.  Denied

any hematemesis, melena or hematochezia.  Denied any dysuria, frequency or

hematuria.  Denied any chest pain, orthopnea, or paroxysmal nocturnal dyspnea. 

Denied any cough, phlegm or hemoptysis.



PHYSICAL EXAMINATION:

GENERAL:  On arrival to the Emergency Room, she looked well and was clearly in

no apparent respiratory distress.  No pallor, jaundice, cyanosis or thyromegaly.

 No jugular venous distention.  No limb edema.

VITAL SIGNS:  Her heart rate was 81, blood pressure was 131/71, temperature was

97.9, respiratory rate was 16, and oxygen saturation was 98%.

HEAD, EYES, EARS, NOSE AND THROAT:  Showed normocephalic and atraumatic.

NECK:  Supple.

HEART:  Showed normal first and second heart sounds.  No gallop or murmur.

CHEST:  Clear to auscultation.  No crepitation or rhonchi.

ABDOMEN:  Distended, soft and nontender.

NEUROLOGIC:  She was awake, alert, responding appropriately.  All cranial nerves

intact.

EXTREMITIES:  She moves extremities without difficulty.  She ambulates without

assistance or assistive devices.



LABORATORY DATA:  Showed a white cell count 11,000, hemoglobin 14.8, hematocrit

44, MCV 87 and platelet count 216,000 with normal manual differential.  Her

chemistry showed a serum sodium 140, potassium 3.9, chloride 103, bicarbonate

25, anion gap of 12, BUN 20, creatinine 0.8, estimated GFR was 76 mL per minute.

 Her glucose 93, calcium was 9.2.  Her total bilirubin, AST, ALT, alkaline

phosphatase were normal.  Total protein 8.1, albumin was 3.9 and lipase was 136.

 The CT scan showed that the patient has no acute abnormality identified in the

abdomen and pelvis.  She has a 3 mm nonobstructing calculus identified in the

inferior pole of the right kidney, has cystic lesion along the vaginal cuff

measures 2.3 x 2.4 cm.



ASSESSMENT AND PLAN:  The patient was therefore transferred to [facility] to consult the Gastroenterology team.  The patient has numerous

intra-abdominal surgeries and I wonder whether adhesion is might be contributing

to her abdominal pain and bloating and fullness.

 



______________________________

EDGAR URENA MD



DR:  KENDALL/maggy  JOB#:  [id number] / [id number]

DD:  2020 11:22  DT:  2020 12:25

## 2020-01-03 VITALS — DIASTOLIC BLOOD PRESSURE: 56 MMHG | SYSTOLIC BLOOD PRESSURE: 91 MMHG

## 2020-01-03 VITALS — DIASTOLIC BLOOD PRESSURE: 44 MMHG | SYSTOLIC BLOOD PRESSURE: 98 MMHG

## 2020-01-03 VITALS — DIASTOLIC BLOOD PRESSURE: 77 MMHG | SYSTOLIC BLOOD PRESSURE: 109 MMHG

## 2020-01-03 VITALS — SYSTOLIC BLOOD PRESSURE: 126 MMHG | DIASTOLIC BLOOD PRESSURE: 79 MMHG

## 2020-01-03 VITALS — DIASTOLIC BLOOD PRESSURE: 57 MMHG | SYSTOLIC BLOOD PRESSURE: 91 MMHG

## 2020-01-03 LAB
ALBUMIN SERPL-MCNC: 4 G/DL (ref 3.4–5)
ALBUMIN/GLOB SERPL: 1 {RATIO} (ref 1–1.7)
ALP SERPL-CCNC: 71 U/L (ref 46–116)
ALT SERPL-CCNC: 14 U/L (ref 14–59)
ANION GAP SERPL CALC-SCNC: 9 MMOL/L (ref 6–14)
AST SERPL-CCNC: 13 U/L (ref 15–37)
BILIRUB SERPL-MCNC: 0.4 MG/DL (ref 0.2–1)
BUN SERPL-MCNC: 13 MG/DL (ref 7–20)
BUN/CREAT SERPL: 16 (ref 6–20)
CALCIUM SERPL-MCNC: 9.2 MG/DL (ref 8.5–10.1)
CHLORIDE SERPL-SCNC: 102 MMOL/L (ref 98–107)
CO2 SERPL-SCNC: 28 MMOL/L (ref 21–32)
CREAT SERPL-MCNC: 0.8 MG/DL (ref 0.6–1)
ERYTHROCYTE [DISTWIDTH] IN BLOOD BY AUTOMATED COUNT: 14.1 % (ref 11.5–14.5)
GFR SERPLBLD BASED ON 1.73 SQ M-ARVRAT: 75.9 ML/MIN
GLOBULIN SER-MCNC: 3.9 G/DL (ref 2.2–3.8)
GLUCOSE SERPL-MCNC: 97 MG/DL (ref 70–99)
HCT VFR BLD CALC: 42.7 % (ref 36–47)
HGB BLD-MCNC: 14.2 G/DL (ref 12–15.5)
MCH RBC QN AUTO: 29 PG (ref 25–35)
MCHC RBC AUTO-ENTMCNC: 33 G/DL (ref 31–37)
MCV RBC AUTO: 89 FL (ref 79–100)
PLATELET # BLD AUTO: 208 X10^3/UL (ref 140–400)
POTASSIUM SERPL-SCNC: 3.6 MMOL/L (ref 3.5–5.1)
PROT SERPL-MCNC: 7.9 G/DL (ref 6.4–8.2)
RBC # BLD AUTO: 4.83 X10^6/UL (ref 3.5–5.4)
SODIUM SERPL-SCNC: 139 MMOL/L (ref 136–145)
WBC # BLD AUTO: 10.8 X10^3/UL (ref 4–11)

## 2020-01-03 RX ADMIN — FAMOTIDINE SCH MG: 20 TABLET ORAL at 21:50

## 2020-01-03 RX ADMIN — VERAPAMIL HYDROCHLORIDE SCH MG: 120 TABLET, FILM COATED, EXTENDED RELEASE ORAL at 21:00

## 2020-01-03 RX ADMIN — FENTANYL CITRATE PRN MCG: 50 INJECTION INTRAMUSCULAR; INTRAVENOUS at 18:54

## 2020-01-03 RX ADMIN — HYDROMORPHONE HYDROCHLORIDE PRN MG: 2 INJECTION INTRAMUSCULAR; INTRAVENOUS; SUBCUTANEOUS at 11:45

## 2020-01-03 RX ADMIN — PRAMIPEXOLE DIHYDROCHLORIDE SCH MG: 0.25 TABLET ORAL at 09:00

## 2020-01-03 RX ADMIN — TRAZODONE HYDROCHLORIDE SCH MG: 50 TABLET ORAL at 21:50

## 2020-01-03 RX ADMIN — FENTANYL CITRATE PRN MCG: 50 INJECTION INTRAMUSCULAR; INTRAVENOUS at 14:09

## 2020-01-03 RX ADMIN — LOSARTAN POTASSIUM SCH MG: 50 TABLET ORAL at 09:00

## 2020-01-03 RX ADMIN — FENTANYL CITRATE PRN MCG: 50 INJECTION INTRAMUSCULAR; INTRAVENOUS at 21:51

## 2020-01-03 RX ADMIN — HYDROMORPHONE HYDROCHLORIDE PRN MG: 2 INJECTION INTRAMUSCULAR; INTRAVENOUS; SUBCUTANEOUS at 08:04

## 2020-01-03 RX ADMIN — VERAPAMIL HYDROCHLORIDE SCH MG: 120 TABLET, FILM COATED, EXTENDED RELEASE ORAL at 09:00

## 2020-01-03 RX ADMIN — HYDROMORPHONE HYDROCHLORIDE PRN MG: 2 INJECTION INTRAMUSCULAR; INTRAVENOUS; SUBCUTANEOUS at 03:03

## 2020-01-03 RX ADMIN — FAMOTIDINE SCH MG: 10 INJECTION, SOLUTION INTRAVENOUS at 08:00

## 2020-01-03 NOTE — PN
DATE:  01/03/2020



SUBJECTIVE:  The patient is resting, slightly propped up in bed, in no apparent

distress.  She continued to have some abdominal pain, nausea, but no vomiting. 

She was seen by the gastroenterologist and they basically recommended a small

bowel series.



PHYSICAL EXAMINATION:

GENERAL:  On examining her today, she looked well and was clearly in no apparent

respiratory distress.

VITAL SIGNS:  Stable.



LABORATORY DATA:  Showed a white cell count 10,800; hemoglobin 14; hematocrit

42; MCV 89 and platelet count 208,000.  Sedimentation rate of 50.  Her

electrolytes are all within normal range.



ASSESSMENT:  Chronic abdominal pain in a patient with multiple surgical

interventions.



PLAN:  Await the result of the small bowel series to decide on further

management.

 



______________________________

EDGAR URENA MD



DR:  KENDALL/maggy  JOB#:  [id number] / [id number]

DD:  01/03/2020 10:13  DT:  01/03/2020 13:35

## 2020-01-03 NOTE — PDOC2
LULU CHAVEZ APRN 1/3/20 0807:


CONSULT


Date of Consult


Date of Consult


DATE: 1/3/20 


TIME: 08:00





Reason for Consult


Reason for Consult:


abdominal pain





Referring Physician


Referring Physician:


Dr Woodard





Identification/Chief Complaint


Chief Complaint


abdominal pain





Source


Source:  Chart review, Patient





History of Present Illness


Reason for Visit:


Reports lower abdominal pain x 2 months, [facility] 

[facility]--[facility] for [facility], urology evaluated, treated for constipation.  

Nothing has improved the pain.  Colonoscopy in past.  Denies any diarrhea


Pain has been progressively worsening, + associated nausea, may be worse after 

eating sometimes 





Hx of serafin, appy, hysterectomy 


Previous hemorrhoidectomy with Dr Lloyd last year





Past Medical History


Cardiovascular:  HTN


Pulmonary:  No pertinent hx


CENTRAL NERVOUS SYSTEM:  Periperal neuropathy, Seizure


GI:  No pertinent hx


Heme/Onc:  No pertinent hx


Hepatobiliary:  No pertinent hx


Psych:  Anxiety, Depression


Musculoskeletal:  Osteoarthritis


Rheumatologic:  Fibromyalgia


Infectious disease:  No pertinent hx


Renal/:  No pertinent hx


Endocrine:  No pertinent hx





Past Surgical History


Past Surgical History:  Arthroscopy, Cholecystectomy, Tonsillectomy, Other





Family History


Family History:  Coronary Artery Disease, Heart Disease





Social History


<1 pack per day


ALCOHOL:  rare


Drugs:  None


Lives:  with Family





Current Medications


Current Medications





Current Medications


Hydromorphone HCl (Dilaudid) 1 mg PRN Q3HRS  PRN IV SEVERE PAIN 7-10 Last 

administered on 1/3/20at 03:03;  Start 1/1/20 at 21:30


Famotidine (Pepcid Vial) 20 mg BID IVP  Last administered on 1/2/20at 21:52;  

Start 1/2/20 at 21:00


Pramipexole Dihydrochloride (miraPEX) 0.5 mg DAILY PO ;  Start 1/3/20 at 09:00


Losartan Potassium (Cozaar) 100 mg DAILY PO ;  Start 1/3/20 at 09:00


Trazodone HCl (Desyrel) 150 mg QHS PO  Last administered on 1/2/20at 21:53;  

Start 1/2/20 at 21:00


Verapamil HCl (Calan Sr) 240 mg BID PO  Last administered on 1/2/20at 21:53;  

Start 1/2/20 at 21:00





Active Scripts


Active


Reported


Pramipexole Dihydrochloride (Pramipexole Di-Hcl) 0.5 Mg Tablet 0.5 Mg  DAILY


Trazodone Hcl 150 Mg Tablet 150  QHS


Verapamil Sr (Verapamil HCl) 240 Mg Cap24h.pel 240  BID


Telmisartan 80 Mg Tablet 80  DAILY





Allergies


Allergies:  


Coded Allergies:  


     atropine (Verified  Allergy, Intermediate, Hives, 5/29/18)


     titanium (Verified  Allergy, Intermediate, SKIN ON AREA TURNED BLACK (ORTHO

SX), 1/1/20)


     tizanidine (Verified  Allergy, Intermediate, HIVES -BUT TAKES TIZANIDINE 

PRN & NO PROBLEM, 5/29/18)


     varenicline (Verified  Allergy, Intermediate, SEIZURES, 1/1/20)





ROS


General:  No: Chills, Other (fevers )


PSYCHOLOGICAL ROS:  No: Anxiety, Depression


Eyes:  No Blurry vision, No Double vision


HEENT:  No: Heacaches, Sore Throat


Hematological and Lymphatic:  No: Bleeding Problems, Blood Clots


Respiratory:  No: Cough, Shortness of breath


Cardiovascular:  No Chest Pain


Gastrointestinal:  Yes Other (see hpi)


Genitourinary:  No Dysuria, No Hematuria


Musculoskeletal:  No Joint Pain, No Muscle Pain


Neurological:  No Impaired Coord/balance, No Numbness/Tingling


Skin:  No Pruritus, No Rash





Physical Exam


General:  Alert, Oriented X3, Cooperative, No acute distress


HEENT:  Atraumatic, PERRLA


Lungs:  Clear to auscultation, Normal air movement


Heart:  Regular rate, Normal S1, Normal S2


Abdomen:  Soft, Other (ND, generalized TTP, worse across lower abdomen )


Extremities:  No clubbing, No cyanosis


Skin:  No rashes, No breakdown


Neuro:  Normal gait, Normal speech


Psych/Mental Status:  Mental status NL, Mood NL


MUSCULOSKELETAL:  No deformity, No swelling





Vitals


VITALS





Vital Signs








  Date Time  Temp Pulse Resp B/P (MAP) Pulse Ox O2 Delivery O2 Flow Rate FiO2


 


1/3/20 03:48      Room Air  


 


1/3/20 03:00 98.4 69 18 91/56 (68) 96   





 98.4       











Labs


Labs





Laboratory Tests








Test


 1/2/20


03:55 1/2/20


14:45 1/3/20


05:25


 


White Blood Count


 8.2 x10^3/uL


(4.0-11.0) 


 10.8 x10^3/uL


(4.0-11.0)


 


Red Blood Count


 4.37 x10^6/uL


(3.50-5.40) 


 4.83 x10^6/uL


(3.50-5.40)


 


Hemoglobin


 12.9 g/dL


(12.0-15.5) 


 14.2 g/dL


(12.0-15.5)


 


Hematocrit


 38.8 %


(36.0-47.0) 


 42.7 %


(36.0-47.0)


 


Mean Corpuscular Volume 89 fL ()   89 fL () 


 


Mean Corpuscular Hemoglobin 30 pg (25-35)   29 pg (25-35) 


 


Mean Corpuscular Hemoglobin


Concent 33 g/dL


(31-37) 


 33 g/dL


(31-37)


 


Red Cell Distribution Width


 14.0 %


(11.5-14.5) 


 14.1 %


(11.5-14.5)


 


Platelet Count


 173 x10^3/uL


(140-400) 


 208 x10^3/uL


(140-400)


 


Neutrophils (%) (Auto) 70 % (31-73)   


 


Lymphocytes (%) (Auto) 23 % (24-48)   


 


Monocytes (%) (Auto) 6 % (0-9)   


 


Eosinophils (%) (Auto) 1 % (0-3)   


 


Basophils (%) (Auto) 0 % (0-3)   


 


Neutrophils # (Auto)


 5.7 x10^3/uL


(1.8-7.7) 


 





 


Lymphocytes # (Auto)


 1.9 x10^3/uL


(1.0-4.8) 


 





 


Monocytes # (Auto)


 0.5 x10^3/uL


(0.0-1.1) 


 





 


Eosinophils # (Auto)


 0.1 x10^3/uL


(0.0-0.7) 


 





 


Basophils # (Auto)


 0.0 x10^3/uL


(0.0-0.2) 


 





 


Erythrocyte Sedimentation Rate 50 (0-25)   


 


Sodium Level


 135 mmol/L


(136-145) 


 139 mmol/L


(136-145)


 


Potassium Level


 3.8 mmol/L


(3.5-5.1) 


 3.6 mmol/L


(3.5-5.1)


 


Chloride Level


 103 mmol/L


() 


 102 mmol/L


()


 


Carbon Dioxide Level


 27 mmol/L


(21-32) 


 28 mmol/L


(21-32)


 


Anion Gap 5 (6-14)   9 (6-14) 


 


Blood Urea Nitrogen


 18 mg/dL


(7-20) 


 13 mg/dL


(7-20)


 


Creatinine


 0.8 mg/dL


(0.6-1.0) 


 0.8 mg/dL


(0.6-1.0)


 


Estimated GFR


(Cockcroft-Gault) 75.9 


 


 75.9 





 


BUN/Creatinine Ratio 23 (6-20)   16 (6-20) 


 


Glucose Level


 96 mg/dL


(70-99) 


 97 mg/dL


(70-99)


 


Calcium Level


 8.8 mg/dL


(8.5-10.1) 


 9.2 mg/dL


(8.5-10.1)


 


Total Bilirubin


 0.2 mg/dL


(0.2-1.0) 


 0.4 mg/dL


(0.2-1.0)


 


Aspartate Amino Transf


(AST/SGOT) 8 U/L (15-37) 


 


 13 U/L (15-37) 





 


Alanine Aminotransferase


(ALT/SGPT) 10 U/L (14-59) 


 


 14 U/L (14-59) 





 


Alkaline Phosphatase


 60 U/L


() 


 71 U/L


()


 


C-Reactive Protein,


Quantitative 3.5 mg/L


(0-3.3) 


 





 


Total Protein


 7.1 g/dL


(6.4-8.2) 


 7.9 g/dL


(6.4-8.2)


 


Albumin


 3.5 g/dL


(3.4-5.0) 


 4.0 g/dL


(3.4-5.0)


 


Albumin/Globulin Ratio 1.0 (1.0-1.7)   1.0 (1.0-1.7) 


 


Lactic Acid Level


 


 0.6 mmol/L


(0.4-2.0) 











Laboratory Tests








Test


 1/2/20


14:45 1/3/20


05:25


 


Lactic Acid Level


 0.6 mmol/L


(0.4-2.0) 





 


White Blood Count


 


 10.8 x10^3/uL


(4.0-11.0)


 


Red Blood Count


 


 4.83 x10^6/uL


(3.50-5.40)


 


Hemoglobin


 


 14.2 g/dL


(12.0-15.5)


 


Hematocrit


 


 42.7 %


(36.0-47.0)


 


Mean Corpuscular Volume  89 fL () 


 


Mean Corpuscular Hemoglobin  29 pg (25-35) 


 


Mean Corpuscular Hemoglobin


Concent 


 33 g/dL


(31-37)


 


Red Cell Distribution Width


 


 14.1 %


(11.5-14.5)


 


Platelet Count


 


 208 x10^3/uL


(140-400)


 


Sodium Level


 


 139 mmol/L


(136-145)


 


Potassium Level


 


 3.6 mmol/L


(3.5-5.1)


 


Chloride Level


 


 102 mmol/L


()


 


Carbon Dioxide Level


 


 28 mmol/L


(21-32)


 


Anion Gap  9 (6-14) 


 


Blood Urea Nitrogen


 


 13 mg/dL


(7-20)


 


Creatinine


 


 0.8 mg/dL


(0.6-1.0)


 


Estimated GFR


(Cockcroft-Gault) 


 75.9 





 


BUN/Creatinine Ratio  16 (6-20) 


 


Glucose Level


 


 97 mg/dL


(70-99)


 


Calcium Level


 


 9.2 mg/dL


(8.5-10.1)


 


Total Bilirubin


 


 0.4 mg/dL


(0.2-1.0)


 


Aspartate Amino Transf


(AST/SGOT) 


 13 U/L (15-37) 





 


Alanine Aminotransferase


(ALT/SGPT) 


 14 U/L (14-59) 





 


Alkaline Phosphatase


 


 71 U/L


()


 


Total Protein


 


 7.9 g/dL


(6.4-8.2)


 


Albumin


 


 4.0 g/dL


(3.4-5.0)


 


Albumin/Globulin Ratio  1.0 (1.0-1.7) 











Images


Images


CT [facility]--IMPRESSION:


1. No acute abnormality is identified in abdomen and pelvis.


2. 3 mm nonobstructing calculus is identified in inferior pole the right 


kidney.


3. Cystic lesion along the vaginal cuff measures 2.3 x 2.4 cm. This 


appears more simple compared to prior examination from 3/30/2012 where 


similar finding is noted measuring approximately 1.7 x 1.8 cm. Further 


evaluation with pelvic ultrasound may be of benefit for further 


characterization. Correlate with surgical history.





Assessment/Plan


Assessment/Plan


chronic abdominal pain


normal imaging this far, labs normal





SBS planned by GI


see no surgical indications at this time


will have Dr Lloyd review





VEDA LLOYD MD 1/3/20 3099:


CONSULT


Assessment/Plan


Assessment/Plan


Patient seen and she was heading down for small bowel series. Chronic abdominal 

pain for the last 2 months unrelenting. Etiology unknown she's had a significant

workup CT scans multiple position visits without determine any etiology. Does 

not appear to be surgical indications at this point we'll follow up on her small

bowel series area agree with Kristin assessment and plan











LULU CHAVEZ             Suman 3, 2020 08:07


VEDA LLOYD MD              Suman 3, 2020 08:26

## 2020-01-03 NOTE — RAD
Examination: SMALL BOWEL SERIES

 

History: Lower abdominal pain

 

Comparison/Correlation: None

 

Findings: Frontal view was obtained demonstrating right upper quadrant 

surgical clips. Moderate quantity of stool in the colon. 0.4 cm diameter 

calcific density overlying the right renal lower pole shadow noted. 

Calcific densities in the pelvis which probably represent phleboliths are 

present. Suture material involving the low pelvic midline noted.

 

Fluoroscopy was utilized for 0.5 minutes.

 

Oral contrast was administered for small bowel series exam. A total of 6 

fluoroscopic images were acquired.

 

Contrast is noted to dissipate in the small bowel. No high-grade 

obstructive finding. No strictures identified. Spot fluoroscopic images 

were obtained but primarily right lower quadrant and pelvic small bowel as

the contrast more proximally the small bowel had dissipated. No suspicious

filling defects. Terminal ileum distends normally. Appendix is not 

identified to pass of contrast.

 

 

Impression:

No obstruction, stricture, or definite filling defects.

 

Electronically signed by: Vargas Augustin MD (1/3/2020 12:02 PM) [facility]

## 2020-01-03 NOTE — NUR
SW following. Discussed with RN, pt is from home with . Pt having a small bowel 
series today. RN advised no SW needs at this time. SW will continue to follow.

## 2020-01-03 NOTE — PDOC
Objective:


Objective:


Reviewed notes - no surgical indications.


Vital Signs:





                                   Vital Signs








  Date Time  Temp Pulse Resp B/P (MAP) Pulse Ox O2 Delivery O2 Flow Rate FiO2


 


1/3/20 08:04 98.2 56 14 98/44 (62) 98 Room Air  





 98.2       








Labs:





Laboratory Tests








Test


 1/2/20


14:45 1/3/20


05:25


 


Lactic Acid Level 0.6 mmol/L  


 


White Blood Count  10.8 x10^3/uL 


 


Red Blood Count  4.83 x10^6/uL 


 


Hemoglobin  14.2 g/dL 


 


Hematocrit  42.7 % 


 


Mean Corpuscular Volume  89 fL 


 


Mean Corpuscular Hemoglobin  29 pg 


 


Mean Corpuscular Hemoglobin


Concent 


 33 g/dL 





 


Red Cell Distribution Width  14.1 % 


 


Platelet Count  208 x10^3/uL 


 


Sodium Level  139 mmol/L 


 


Potassium Level  3.6 mmol/L 


 


Chloride Level  102 mmol/L 


 


Carbon Dioxide Level  28 mmol/L 


 


Anion Gap  9 


 


Blood Urea Nitrogen  13 mg/dL 


 


Creatinine  0.8 mg/dL 


 


Estimated GFR


(Cockcroft-Gault) 


 75.9 





 


BUN/Creatinine Ratio  16 


 


Glucose Level  97 mg/dL 


 


Calcium Level  9.2 mg/dL 


 


Total Bilirubin  0.4 mg/dL 


 


Aspartate Amino Transf


(AST/SGOT) 


 13 U/L 





 


Alanine Aminotransferase


(ALT/SGPT) 


 14 U/L 





 


Alkaline Phosphatase  71 U/L 


 


Total Protein  7.9 g/dL 


 


Albumin  4.0 g/dL 


 


Albumin/Globulin Ratio  1.0 


 


Cortisol AM Sample  3.4 ug/dL 








Imaging:


SBS 1/3/20


pending





PE:


out of room





A/P:


Abd pain, nausea, h/o abd surgeries





--


Await SBS.


Note lowish Cortisol - ?recheck - will review w/ Dr. Woodard.











JEWEL OSORIO          Suman 3, 2020 10:54


SAM WOODARD MD              Suman 3, 2020 10:57

## 2020-01-04 VITALS — DIASTOLIC BLOOD PRESSURE: 65 MMHG | SYSTOLIC BLOOD PRESSURE: 100 MMHG

## 2020-01-04 VITALS — DIASTOLIC BLOOD PRESSURE: 61 MMHG | SYSTOLIC BLOOD PRESSURE: 95 MMHG

## 2020-01-04 VITALS — DIASTOLIC BLOOD PRESSURE: 56 MMHG | SYSTOLIC BLOOD PRESSURE: 99 MMHG

## 2020-01-04 VITALS — DIASTOLIC BLOOD PRESSURE: 58 MMHG | SYSTOLIC BLOOD PRESSURE: 101 MMHG

## 2020-01-04 VITALS — DIASTOLIC BLOOD PRESSURE: 74 MMHG | SYSTOLIC BLOOD PRESSURE: 132 MMHG

## 2020-01-04 VITALS — SYSTOLIC BLOOD PRESSURE: 104 MMHG | DIASTOLIC BLOOD PRESSURE: 61 MMHG

## 2020-01-04 RX ADMIN — FENTANYL CITRATE PRN MCG: 50 INJECTION INTRAMUSCULAR; INTRAVENOUS at 08:30

## 2020-01-04 RX ADMIN — LOSARTAN POTASSIUM SCH MG: 50 TABLET ORAL at 09:00

## 2020-01-04 RX ADMIN — FENTANYL CITRATE PRN MCG: 50 INJECTION INTRAMUSCULAR; INTRAVENOUS at 03:35

## 2020-01-04 RX ADMIN — FENTANYL CITRATE PRN MCG: 50 INJECTION INTRAMUSCULAR; INTRAVENOUS at 14:43

## 2020-01-04 RX ADMIN — PRAMIPEXOLE DIHYDROCHLORIDE SCH MG: 0.25 TABLET ORAL at 09:03

## 2020-01-04 RX ADMIN — TRAZODONE HYDROCHLORIDE SCH MG: 50 TABLET ORAL at 21:04

## 2020-01-04 RX ADMIN — FENTANYL CITRATE PRN MCG: 50 INJECTION INTRAMUSCULAR; INTRAVENOUS at 21:07

## 2020-01-04 RX ADMIN — VERAPAMIL HYDROCHLORIDE SCH MG: 120 TABLET, FILM COATED, EXTENDED RELEASE ORAL at 09:00

## 2020-01-04 RX ADMIN — FENTANYL CITRATE PRN MCG: 50 INJECTION INTRAMUSCULAR; INTRAVENOUS at 17:47

## 2020-01-04 RX ADMIN — VERAPAMIL HYDROCHLORIDE SCH MG: 120 TABLET, FILM COATED, EXTENDED RELEASE ORAL at 21:04

## 2020-01-04 RX ADMIN — FAMOTIDINE SCH MG: 20 TABLET ORAL at 21:04

## 2020-01-04 RX ADMIN — FENTANYL CITRATE PRN MCG: 50 INJECTION INTRAMUSCULAR; INTRAVENOUS at 11:43

## 2020-01-04 NOTE — PDOC
SURGICAL PROGRESS NOTE


Subjective


still complains of abdominal pain


"killing me"


Vital Signs





Vital Signs








  Date Time  Temp Pulse Resp B/P (MAP) Pulse Ox O2 Delivery O2 Flow Rate FiO2


 


1/4/20 12:20      Room Air  


 


1/4/20 09:00  69  99/56    


 


1/4/20 07:00 97.7  16  94   





 97.7       








I&O











Intake and Output 


 


 1/4/20





 07:00


 


Intake Total 300 ml


 


Balance 300 ml


 


 


 


Intake Oral 300 ml


 


# Voids 2








PATIENT HAS A HOPE:  No


General:  Alert


Abdomen:  Soft, Other (diffusely TTP)


Labs





Laboratory Tests








Test


 1/2/20


14:45 1/3/20


05:25


 


Lactic Acid Level


 0.6 mmol/L


(0.4-2.0) 





 


White Blood Count


 


 10.8 x10^3/uL


(4.0-11.0)


 


Red Blood Count


 


 4.83 x10^6/uL


(3.50-5.40)


 


Hemoglobin


 


 14.2 g/dL


(12.0-15.5)


 


Hematocrit


 


 42.7 %


(36.0-47.0)


 


Mean Corpuscular Volume  89 fL () 


 


Mean Corpuscular Hemoglobin  29 pg (25-35) 


 


Mean Corpuscular Hemoglobin


Concent 


 33 g/dL


(31-37)


 


Red Cell Distribution Width


 


 14.1 %


(11.5-14.5)


 


Platelet Count


 


 208 x10^3/uL


(140-400)


 


Sodium Level


 


 139 mmol/L


(136-145)


 


Potassium Level


 


 3.6 mmol/L


(3.5-5.1)


 


Chloride Level


 


 102 mmol/L


()


 


Carbon Dioxide Level


 


 28 mmol/L


(21-32)


 


Anion Gap  9 (6-14) 


 


Blood Urea Nitrogen


 


 13 mg/dL


(7-20)


 


Creatinine


 


 0.8 mg/dL


(0.6-1.0)


 


Estimated GFR


(Cockcroft-Gault) 


 75.9 





 


BUN/Creatinine Ratio  16 (6-20) 


 


Glucose Level


 


 97 mg/dL


(70-99)


 


Calcium Level


 


 9.2 mg/dL


(8.5-10.1)


 


Total Bilirubin


 


 0.4 mg/dL


(0.2-1.0)


 


Aspartate Amino Transf


(AST/SGOT) 


 13 U/L (15-37) 





 


Alanine Aminotransferase


(ALT/SGPT) 


 14 U/L (14-59) 





 


Alkaline Phosphatase


 


 71 U/L


()


 


Total Protein


 


 7.9 g/dL


(6.4-8.2)


 


Albumin


 


 4.0 g/dL


(3.4-5.0)


 


Albumin/Globulin Ratio  1.0 (1.0-1.7) 


 


Cortisol AM Sample


 


 3.4 ug/dL


(4.3-22.4)








I have reviewed the following


SBFT unremarkable


Assessment/Plan


unexplained abdominal pain





no surgical recs


continue supportive care











JADE GRAHAM MD                 Jan 4, 2020 14:10

## 2020-01-05 VITALS — SYSTOLIC BLOOD PRESSURE: 132 MMHG | DIASTOLIC BLOOD PRESSURE: 71 MMHG

## 2020-01-05 VITALS — DIASTOLIC BLOOD PRESSURE: 88 MMHG | SYSTOLIC BLOOD PRESSURE: 158 MMHG

## 2020-01-05 VITALS — SYSTOLIC BLOOD PRESSURE: 79 MMHG | DIASTOLIC BLOOD PRESSURE: 42 MMHG

## 2020-01-05 VITALS — DIASTOLIC BLOOD PRESSURE: 51 MMHG | SYSTOLIC BLOOD PRESSURE: 106 MMHG

## 2020-01-05 VITALS — DIASTOLIC BLOOD PRESSURE: 57 MMHG | SYSTOLIC BLOOD PRESSURE: 92 MMHG

## 2020-01-05 VITALS — SYSTOLIC BLOOD PRESSURE: 94 MMHG | DIASTOLIC BLOOD PRESSURE: 52 MMHG

## 2020-01-05 LAB
ALBUMIN SERPL-MCNC: 3.4 G/DL (ref 3.4–5)
ALBUMIN/GLOB SERPL: 0.9 {RATIO} (ref 1–1.7)
ALP SERPL-CCNC: 67 U/L (ref 46–116)
ALT SERPL-CCNC: 16 U/L (ref 14–59)
ANION GAP SERPL CALC-SCNC: 10 MMOL/L (ref 6–14)
AST SERPL-CCNC: 20 U/L (ref 15–37)
BASOPHILS # BLD AUTO: 0 X10^3/UL (ref 0–0.2)
BASOPHILS NFR BLD: 0 % (ref 0–3)
BILIRUB SERPL-MCNC: 0.2 MG/DL (ref 0.2–1)
BUN SERPL-MCNC: 16 MG/DL (ref 7–20)
BUN/CREAT SERPL: 23 (ref 6–20)
CALCIUM SERPL-MCNC: 8.9 MG/DL (ref 8.5–10.1)
CHLORIDE SERPL-SCNC: 105 MMOL/L (ref 98–107)
CO2 SERPL-SCNC: 24 MMOL/L (ref 21–32)
CREAT SERPL-MCNC: 0.7 MG/DL (ref 0.6–1)
EOSINOPHIL NFR BLD: 0.1 X10^3/UL (ref 0–0.7)
EOSINOPHIL NFR BLD: 1 % (ref 0–3)
ERYTHROCYTE [DISTWIDTH] IN BLOOD BY AUTOMATED COUNT: 13.9 % (ref 11.5–14.5)
GFR SERPLBLD BASED ON 1.73 SQ M-ARVRAT: 88.6 ML/MIN
GLOBULIN SER-MCNC: 3.7 G/DL (ref 2.2–3.8)
GLUCOSE SERPL-MCNC: 99 MG/DL (ref 70–99)
HCT VFR BLD CALC: 39.3 % (ref 36–47)
HGB BLD-MCNC: 13 G/DL (ref 12–15.5)
INFLUENZA A PATIENT: NEGATIVE
INFLUENZA B PATIENT: NEGATIVE
LDH SERPL L TO P-CCNC: 168 U/L (ref 81–234)
LYMPHOCYTES # BLD: 2.2 X10^3/UL (ref 1–4.8)
LYMPHOCYTES NFR BLD AUTO: 28 % (ref 24–48)
MCH RBC QN AUTO: 29 PG (ref 25–35)
MCHC RBC AUTO-ENTMCNC: 33 G/DL (ref 31–37)
MCV RBC AUTO: 88 FL (ref 79–100)
MONO #: 0.4 X10^3/UL (ref 0–1.1)
MONOCYTES NFR BLD: 5 % (ref 0–9)
NEUT #: 5.4 X10^3/UL (ref 1.8–7.7)
NEUTROPHILS NFR BLD AUTO: 66 % (ref 31–73)
PLATELET # BLD AUTO: 186 X10^3/UL (ref 140–400)
POTASSIUM SERPL-SCNC: 4 MMOL/L (ref 3.5–5.1)
PROT SERPL-MCNC: 7.1 G/DL (ref 6.4–8.2)
RBC # BLD AUTO: 4.45 X10^6/UL (ref 3.5–5.4)
SODIUM SERPL-SCNC: 139 MMOL/L (ref 136–145)
WBC # BLD AUTO: 8.1 X10^3/UL (ref 4–11)

## 2020-01-05 RX ADMIN — FENTANYL CITRATE PRN MCG: 50 INJECTION INTRAMUSCULAR; INTRAVENOUS at 14:26

## 2020-01-05 RX ADMIN — BENZOCAINE AND MENTHOL PRN LOZ: 15; 3.6 LOZENGE ORAL at 20:56

## 2020-01-05 RX ADMIN — FENTANYL CITRATE PRN MCG: 50 INJECTION INTRAMUSCULAR; INTRAVENOUS at 06:42

## 2020-01-05 RX ADMIN — DEXAMETHASONE SODIUM PHOSPHATE SCH MG: 4 INJECTION, SOLUTION INTRAMUSCULAR; INTRAVENOUS at 20:54

## 2020-01-05 RX ADMIN — VERAPAMIL HYDROCHLORIDE SCH MG: 120 TABLET, FILM COATED, EXTENDED RELEASE ORAL at 09:00

## 2020-01-05 RX ADMIN — DEXAMETHASONE SODIUM PHOSPHATE SCH MG: 4 INJECTION, SOLUTION INTRAMUSCULAR; INTRAVENOUS at 10:29

## 2020-01-05 RX ADMIN — TRAZODONE HYDROCHLORIDE SCH MG: 50 TABLET ORAL at 20:55

## 2020-01-05 RX ADMIN — FENTANYL CITRATE PRN MCG: 50 INJECTION INTRAMUSCULAR; INTRAVENOUS at 09:46

## 2020-01-05 RX ADMIN — BACITRACIN SCH MLS/HR: 5000 INJECTION, POWDER, FOR SOLUTION INTRAMUSCULAR at 20:58

## 2020-01-05 RX ADMIN — BENZOCAINE AND MENTHOL PRN LOZ: 15; 3.6 LOZENGE ORAL at 15:46

## 2020-01-05 RX ADMIN — BENZOCAINE AND MENTHOL PRN LOZ: 15; 3.6 LOZENGE ORAL at 17:59

## 2020-01-05 RX ADMIN — PRAMIPEXOLE DIHYDROCHLORIDE SCH MG: 0.25 TABLET ORAL at 09:45

## 2020-01-05 RX ADMIN — FENTANYL CITRATE PRN MCG: 50 INJECTION INTRAMUSCULAR; INTRAVENOUS at 02:45

## 2020-01-05 RX ADMIN — VERAPAMIL HYDROCHLORIDE SCH MG: 120 TABLET, FILM COATED, EXTENDED RELEASE ORAL at 20:56

## 2020-01-05 RX ADMIN — FENTANYL CITRATE PRN MCG: 50 INJECTION INTRAMUSCULAR; INTRAVENOUS at 20:52

## 2020-01-05 RX ADMIN — FENTANYL CITRATE PRN MCG: 50 INJECTION INTRAMUSCULAR; INTRAVENOUS at 17:28

## 2020-01-05 RX ADMIN — BACITRACIN SCH MLS/HR: 5000 INJECTION, POWDER, FOR SOLUTION INTRAMUSCULAR at 10:29

## 2020-01-05 RX ADMIN — FAMOTIDINE SCH MG: 20 TABLET ORAL at 20:55

## 2020-01-05 RX ADMIN — Medication SCH EA: at 20:56

## 2020-01-05 RX ADMIN — LOSARTAN POTASSIUM SCH MG: 50 TABLET ORAL at 09:00

## 2020-01-05 NOTE — PN
DATE:  01/05/2020



SUBJECTIVE:  The patient continued to complain of abdominal pain.  She is

hypotensive today with a blood pressure down to 80/40.  Her serum cortisol was

low at 3.4 and therefore, I started her on dexamethasone 2 mg IV twice a day. 

She was given a bolus of normal saline at 500 and we will continue with 100 mL

of normal saline per hour.  I will arrange for her to have cosyntropin test

tomorrow morning to confirm or exclude the possibility of McCoy's disease for

at least hypoadrenalism.



OBJECTIVE:

GENERAL:  When I saw her this morning, she was resting slightly propped up in

bed, in no apparent respiratory distress.  She was pale, but no jaundice,

cyanosis or thyromegaly.  No jugular venous distention.  No limb edema.

VITAL SIGNS:  Her heart rate was 63, blood pressure was 79/42, temperature was

97.6, respiratory rate 20, and oxygen saturation was 92% on room air.



The rest of clinical examination is stable.  She continued to have diffuse

abdominal tenderness, however, so far all the imaging studies showed no evidence

of any mechanical obstruction or any other pathology in her abdomen.



LABORATORY DATA:  Her lab work this morning showed a white cell count of 8000,

hemoglobin 13, hematocrit 39, MCV 88 and platelet count of 106,000.  Her

chemistry showed a serum sodium 139, potassium 4, chloride 105, bicarbonate 24,

anion gap of 10, BUN 16, creatinine 0.7, estimated GFR was 88 mL per minute. 

Her glucose was 99, calcium was 8.9.  Total bilirubin, AST, ALT, alkaline

phosphatase were normal.  Lactate dehydrogenase was only 168.  Total protein was

7.1, albumin was 3.4 and morning cortisol was very low at 3.4, normal range is

4.3-22.4.



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

will arrange for her to have cosyntropin test and was started on dexamethasone,

so that it does not interfere with the measurement of serum cortisol tomorrow.

 



______________________________

EDGAR URENA MD



DR:  KENDALL/maggy  JOB#:  [id number] / [id number]

DD:  01/05/2020 10:22  DT:  01/05/2020 12:56

## 2020-01-05 NOTE — PDOC
GI PROGRESS NOTES


Date


Date/Time


DATE: 1/5/20 


TIME: 14:59





Subjective


Subjective


No change in chronic abdominal symptoms.


Bowel follow-through was normal


Cortisol level was low. Agree with plans for ACTH stimulation test tomorrow


Continue to look for other potential causes for abdominal pain





Objective


Vitals





Vital Signs








  Date Time  Temp Pulse Resp B/P (MAP) Pulse Ox O2 Delivery O2 Flow Rate FiO2


 


1/5/20 14:26      Room Air  


 


1/5/20 10:29      Room Air  


 


1/5/20 09:46      Room Air  


 


1/5/20 09:00  63  79/42    


 


1/5/20 09:00  63  79/42    


 


1/5/20 08:30      Room Air  


 


1/5/20 08:00      Room Air  


 


1/5/20 07:00 97.6 63 20 79/42 (54) 92 Room Air  





 97.6       


 


1/5/20 06:42      Room Air  


 


1/5/20 03:15      Room Air  


 


1/5/20 03:13 98.0 63 16 94/52 (66) 95 Room Air  





 98.0       


 


1/5/20 02:45      Room Air  


 


1/4/20 23:06 98.3 58 18 101/58 (72) 96 Room Air  





 98.3       


 


1/4/20 21:53      Room Air  


 


1/4/20 21:07      Room Air  


 


1/4/20 21:04  56  132/74    


 


1/4/20 20:00      Room Air  


 


1/4/20 19:00 98.3 56 20 132/74 (93) 96 Room Air  





 98.3       


 


1/4/20 18:19     95 Room Air  


 


1/4/20 17:47      Room Air  


 


1/4/20 15:17      Room Air  


 


1/4/20 15:00 97.7 73 16 100/65 (77) 95 Room Air  





 97.7       











Labs


Labs





Laboratory Tests








Test


 1/5/20


03:25


 


White Blood Count


 8.1 x10^3/uL


(4.0-11.0)


 


Red Blood Count


 4.45 x10^6/uL


(3.50-5.40)


 


Hemoglobin


 13.0 g/dL


(12.0-15.5)


 


Hematocrit


 39.3 %


(36.0-47.0)


 


Mean Corpuscular Volume 88 fL () 


 


Mean Corpuscular Hemoglobin 29 pg (25-35) 


 


Mean Corpuscular Hemoglobin


Concent 33 g/dL


(31-37)


 


Red Cell Distribution Width


 13.9 %


(11.5-14.5)


 


Platelet Count


 186 x10^3/uL


(140-400)


 


Neutrophils (%) (Auto) 66 % (31-73) 


 


Lymphocytes (%) (Auto) 28 % (24-48) 


 


Monocytes (%) (Auto) 5 % (0-9) 


 


Eosinophils (%) (Auto) 1 % (0-3) 


 


Basophils (%) (Auto) 0 % (0-3) 


 


Neutrophils # (Auto)


 5.4 x10^3/uL


(1.8-7.7)


 


Lymphocytes # (Auto)


 2.2 x10^3/uL


(1.0-4.8)


 


Monocytes # (Auto)


 0.4 x10^3/uL


(0.0-1.1)


 


Eosinophils # (Auto)


 0.1 x10^3/uL


(0.0-0.7)


 


Basophils # (Auto)


 0.0 x10^3/uL


(0.0-0.2)


 


Sodium Level


 139 mmol/L


(136-145)


 


Potassium Level


 4.0 mmol/L


(3.5-5.1)


 


Chloride Level


 105 mmol/L


()


 


Carbon Dioxide Level


 24 mmol/L


(21-32)


 


Anion Gap 10 (6-14) 


 


Blood Urea Nitrogen


 16 mg/dL


(7-20)


 


Creatinine


 0.7 mg/dL


(0.6-1.0)


 


Estimated GFR


(Cockcroft-Gault) 88.6 





 


BUN/Creatinine Ratio 23 (6-20) 


 


Glucose Level


 99 mg/dL


(70-99)


 


Calcium Level


 8.9 mg/dL


(8.5-10.1)


 


Total Bilirubin


 0.2 mg/dL


(0.2-1.0)


 


Aspartate Amino Transf


(AST/SGOT) 20 U/L (15-37) 





 


Alanine Aminotransferase


(ALT/SGPT) 16 U/L (14-59) 





 


Alkaline Phosphatase


 67 U/L


()


 


Lactate Dehydrogenase


 168 U/L


()


 


Total Protein


 7.1 g/dL


(6.4-8.2)


 


Albumin


 3.4 g/dL


(3.4-5.0)


 


Albumin/Globulin Ratio 0.9 (1.0-1.7) 











Physical Exam


Physical Exam


chest- Clear





Abdomen soft, mild diffuse tenderness, bowel sounds normal





Assessment


Assessment


Abdominal pain.  Since of workup so far is unrevealing except for a low cortisol

level.





ACTH stimulation test ordered


But need to keep other rare causes for abdominal pain in the differential











BISHOP RESENDIZ MD                Jan 5, 2020 15:01

## 2020-01-05 NOTE — PN
DATE:  01/04/2020



SUBJECTIVE:  The patient continued to complain of abdominal pain without any

obvious detectable abnormalities.  She has had a CT scan done, which showed no

evidence of any obstruction, had a small bowel follow through, which showed that

the contrast was noted to dissipate in the small bowel.  No high-grade

obstructive findings.  No stricture identified.  Spot fluoroscopic images were

obtained, but primarily right lower quadrant and pelvic small bowel as the

contrast more proximally had dissipated.  No suspicious filling defects. 

Terminal ileum distends normally.  Appendix is not identified to pass contrast

and it showed that the patient has no obstruction, stricture or definite filling

defect.



OBJECTIVE:

VITAL SIGNS:  Today are stable.  Her heart rate was 67, blood pressure 104/60,

temperature 97.7, respiratory rate was 16, and oxygen saturation 94%.



The other finding, clinical exam is stable.



LABORATORY DATA:  As of yesterday showed a serum sodium of 139, potassium 3.6,

chloride 102, bicarbonate 28, anion gap of 9, BUN 13, creatinine 0.8, estimated

GFR was 76 mL per minute.  Glucose 97, calcium 9.2.  Total bilirubin, AST, ALT,

alkaline phosphatase were normal.  Total protein was 7.9, albumin was 1.  Her

cortisol was 3.4 mcg/dL.



PLAN:  My plan is to continue with current plan of management and await

evaluation by the gastroenterologist and surgical team.

 



______________________________

EDGAR URENA MD



DR:  KENDALL/[email or web address]  JOB#:  [id number] / [id number]

DD:  01/04/2020 11:12  DT:  01/04/2020 22:36

## 2020-01-06 VITALS — DIASTOLIC BLOOD PRESSURE: 56 MMHG | SYSTOLIC BLOOD PRESSURE: 95 MMHG

## 2020-01-06 VITALS — DIASTOLIC BLOOD PRESSURE: 62 MMHG | SYSTOLIC BLOOD PRESSURE: 116 MMHG

## 2020-01-06 VITALS — SYSTOLIC BLOOD PRESSURE: 152 MMHG | DIASTOLIC BLOOD PRESSURE: 84 MMHG

## 2020-01-06 VITALS — SYSTOLIC BLOOD PRESSURE: 140 MMHG | DIASTOLIC BLOOD PRESSURE: 71 MMHG

## 2020-01-06 VITALS — SYSTOLIC BLOOD PRESSURE: 153 MMHG | DIASTOLIC BLOOD PRESSURE: 74 MMHG

## 2020-01-06 VITALS — SYSTOLIC BLOOD PRESSURE: 124 MMHG | DIASTOLIC BLOOD PRESSURE: 68 MMHG

## 2020-01-06 RX ADMIN — BENZOCAINE AND MENTHOL PRN LOZ: 15; 3.6 LOZENGE ORAL at 21:44

## 2020-01-06 RX ADMIN — BACITRACIN SCH MLS/HR: 5000 INJECTION, POWDER, FOR SOLUTION INTRAMUSCULAR at 05:58

## 2020-01-06 RX ADMIN — FENTANYL CITRATE PRN MCG: 50 INJECTION INTRAMUSCULAR; INTRAVENOUS at 05:55

## 2020-01-06 RX ADMIN — DEXAMETHASONE SODIUM PHOSPHATE SCH MG: 4 INJECTION, SOLUTION INTRAMUSCULAR; INTRAVENOUS at 21:33

## 2020-01-06 RX ADMIN — FENTANYL CITRATE PRN MCG: 50 INJECTION INTRAMUSCULAR; INTRAVENOUS at 11:59

## 2020-01-06 RX ADMIN — FENTANYL CITRATE PRN MCG: 50 INJECTION INTRAMUSCULAR; INTRAVENOUS at 00:10

## 2020-01-06 RX ADMIN — FENTANYL CITRATE PRN MCG: 50 INJECTION INTRAMUSCULAR; INTRAVENOUS at 18:25

## 2020-01-06 RX ADMIN — LOSARTAN POTASSIUM SCH MG: 50 TABLET ORAL at 08:19

## 2020-01-06 RX ADMIN — FENTANYL CITRATE PRN MCG: 50 INJECTION INTRAMUSCULAR; INTRAVENOUS at 15:42

## 2020-01-06 RX ADMIN — BACITRACIN SCH MLS/HR: 5000 INJECTION, POWDER, FOR SOLUTION INTRAMUSCULAR at 15:46

## 2020-01-06 RX ADMIN — FAMOTIDINE SCH MG: 20 TABLET ORAL at 21:33

## 2020-01-06 RX ADMIN — FENTANYL CITRATE PRN MCG: 50 INJECTION INTRAMUSCULAR; INTRAVENOUS at 21:30

## 2020-01-06 RX ADMIN — FENTANYL CITRATE PRN MCG: 50 INJECTION INTRAMUSCULAR; INTRAVENOUS at 09:06

## 2020-01-06 RX ADMIN — PRAMIPEXOLE DIHYDROCHLORIDE SCH MG: 0.25 TABLET ORAL at 08:19

## 2020-01-06 RX ADMIN — Medication SCH EA: at 21:31

## 2020-01-06 RX ADMIN — DEXAMETHASONE SODIUM PHOSPHATE SCH MG: 4 INJECTION, SOLUTION INTRAMUSCULAR; INTRAVENOUS at 08:18

## 2020-01-06 RX ADMIN — VERAPAMIL HYDROCHLORIDE SCH MG: 120 TABLET, FILM COATED, EXTENDED RELEASE ORAL at 08:19

## 2020-01-06 RX ADMIN — TRAZODONE HYDROCHLORIDE SCH MG: 50 TABLET ORAL at 21:33

## 2020-01-06 RX ADMIN — VERAPAMIL HYDROCHLORIDE SCH MG: 120 TABLET, FILM COATED, EXTENDED RELEASE ORAL at 21:32

## 2020-01-06 NOTE — PDOC
Subjective:


Subjective:


Doesn't feel any better, not sure if steroids making a difference.


Abd felt bloated - maybe more pain? - after eating yesterday but this not 

consistent.


Doesn't have an appetite.





Objective:


Objective:


D/w Dr. Silverman this morning.


Vital Signs:





                                   Vital Signs








  Date Time  Temp Pulse Resp B/P (MAP) Pulse Ox O2 Delivery O2 Flow Rate FiO2


 


1/6/20 12:15   20  95 Room Air  


 


1/6/20 10:54 97.7 73  95/56 (69)    





 97.7       








Labs:





Laboratory Tests








Test


 1/5/20


15:50 1/6/20


08:05 1/6/20


08:45


 


Influenza Type A Antigen Negative   


 


Influenza Type B Antigen Negative   


 


Cortisol AM Sample  0.9 ug/dL  14.3 ug/dL 








Imaging:


SBS 1/3


Impression:


No obstruction, stricture, or definite filling defects.





Abd Doppler 1/6


Impression:


1.  Elevated velocity within the superior mesenteric artery, may indicate 

stenosis.





PE:





GEN: NAD - finishing a large caramel coffee drink


LUNGS: room air


HEART: RRR


ABD: quiet, soft, diffusely non-specifically tender


NEURO/PSYCH: A & O 3





A/P:


Abd pain, anorexia


Low Cortisol


?SMA stenosis - US as above





--


Feels the same.  Will review next step w/ Dr. Woodard.











JEWEL OSORIO          Jan 6, 2020 12:22

## 2020-01-06 NOTE — NUR
SW following. Discussed with RN, pt is from home with / family, ad issac. RN advised no 
SW needs at this time. SW will continue to follow.

## 2020-01-06 NOTE — PN
DATE:  01/06/2020



SUBJECTIVE:  The patient is resting, slightly propped up in bed, in no apparent

respiratory distress.  She continued to complain of abdominal bloating and

abdominal pain.  So far, all our investigation are negative.  I did have even

ACTH stimulation test as her morning cortisol was high and patient responded

very well to her ACTH stimulation and her serum cortisol has risen to 14.3

mcg/dL.  On questioning, the patient apparently has been on multiple courses of

steroids indicating that she probably has suppressed adrenal gland.



PHYSICAL EXAMINATION:

GENERAL:  When I examined her, she looked well and was clearly in no apparent

respiratory distress.  No pallor, jaundice, cyanosis or thyromegaly.  No jugular

venous distension.  No lower limb edema.

VITAL SIGNS:  Her heart rate was 60, blood pressure 152/84, temperature 97.8,

respiratory rate was 16, and oxygen saturation was 96% on room air.

HEAD, EYES, EARS, NOSE AND THROAT:  Normocephalic, atraumatic.

NECK:  Supple.  The rest of clinical exam is stable.  



So far, all her lab work including a CBC and chemistry are all within acceptable

range.  Her imaging studies including CT scan and small bowel follow-through

showed no evidence of any pathology.  



She does have a small cyst in the vault of her vagina after she had hysterectomy

and recommended that she should be seen by a gynecologist but that in itself

should not explain all her symptoms.  I am waiting for the Gastroenterology team

to decide if they have any other suggestion.

 



______________________________

EDGAR URENA MD



DR:  KENDALL/maggy  JOB#:  [id number] / [id number]

DD:  01/06/2020 10:13  DT:  01/06/2020 12:33

## 2020-01-06 NOTE — RAD
ABD PELVIS RETROPERITON DOPP

 

History: Abdominal pain. Concern for bowel ischemia. Vasculopathy.

 

Comparison: CT January 1, 2020

 

Technique: Sonographic examination of the mesenteric arteries with 

visceral Doppler

 

Findings:

Aortic velocity 97 cm/s. Patent hepatic artery with normal velocity 86.7 

cm/s.

 

Elevated velocity within the superior mesenteric artery proximally 

measuring 405 cm/s, mid 335 cm/s and distal 270 cm/s.

 

Normal velocity within the celiac artery measures 110 cm/s.

 

Impression:

1.  Elevated velocity within the superior mesenteric artery, may indicate 

stenosis.

 

Electronically signed by: Samuel Bauer DO (1/6/2020 8:19 AM) [facility]

## 2020-01-07 VITALS — SYSTOLIC BLOOD PRESSURE: 158 MMHG | DIASTOLIC BLOOD PRESSURE: 84 MMHG

## 2020-01-07 VITALS — SYSTOLIC BLOOD PRESSURE: 125 MMHG | DIASTOLIC BLOOD PRESSURE: 62 MMHG

## 2020-01-07 VITALS — DIASTOLIC BLOOD PRESSURE: 79 MMHG | SYSTOLIC BLOOD PRESSURE: 124 MMHG

## 2020-01-07 VITALS — SYSTOLIC BLOOD PRESSURE: 127 MMHG | DIASTOLIC BLOOD PRESSURE: 73 MMHG

## 2020-01-07 VITALS — DIASTOLIC BLOOD PRESSURE: 82 MMHG | SYSTOLIC BLOOD PRESSURE: 132 MMHG

## 2020-01-07 VITALS — DIASTOLIC BLOOD PRESSURE: 82 MMHG | SYSTOLIC BLOOD PRESSURE: 118 MMHG

## 2020-01-07 RX ADMIN — LUBIPROSTONE SCH MCG: 24 CAPSULE, GELATIN COATED ORAL at 16:37

## 2020-01-07 RX ADMIN — BACITRACIN SCH MLS/HR: 5000 INJECTION, POWDER, FOR SOLUTION INTRAMUSCULAR at 10:21

## 2020-01-07 RX ADMIN — BACITRACIN SCH MLS/HR: 5000 INJECTION, POWDER, FOR SOLUTION INTRAMUSCULAR at 19:58

## 2020-01-07 RX ADMIN — VERAPAMIL HYDROCHLORIDE SCH MG: 120 TABLET, FILM COATED, EXTENDED RELEASE ORAL at 09:00

## 2020-01-07 RX ADMIN — BACITRACIN SCH MLS/HR: 5000 INJECTION, POWDER, FOR SOLUTION INTRAMUSCULAR at 01:25

## 2020-01-07 RX ADMIN — PANTOPRAZOLE SODIUM SCH MG: 40 TABLET, DELAYED RELEASE ORAL at 16:38

## 2020-01-07 RX ADMIN — FENTANYL CITRATE PRN MCG: 50 INJECTION INTRAMUSCULAR; INTRAVENOUS at 01:25

## 2020-01-07 RX ADMIN — Medication SCH EA: at 20:39

## 2020-01-07 RX ADMIN — POLYETHYLENE GLYCOL 3350 SCH GM: 17 POWDER, FOR SOLUTION ORAL at 20:40

## 2020-01-07 RX ADMIN — FENTANYL CITRATE PRN MCG: 50 INJECTION INTRAMUSCULAR; INTRAVENOUS at 11:54

## 2020-01-07 RX ADMIN — FENTANYL CITRATE PRN MCG: 50 INJECTION INTRAMUSCULAR; INTRAVENOUS at 08:53

## 2020-01-07 RX ADMIN — TRAZODONE HYDROCHLORIDE SCH MG: 50 TABLET ORAL at 20:38

## 2020-01-07 RX ADMIN — LOSARTAN POTASSIUM SCH MG: 50 TABLET ORAL at 16:38

## 2020-01-07 RX ADMIN — VERAPAMIL HYDROCHLORIDE SCH MG: 120 TABLET, FILM COATED, EXTENDED RELEASE ORAL at 20:39

## 2020-01-07 RX ADMIN — FENTANYL CITRATE PRN MCG: 50 INJECTION INTRAMUSCULAR; INTRAVENOUS at 19:58

## 2020-01-07 RX ADMIN — PRAMIPEXOLE DIHYDROCHLORIDE SCH MG: 0.25 TABLET ORAL at 16:38

## 2020-01-07 NOTE — PN
DATE:  01/07/2020



SUBJECTIVE:  The patient is resting, slightly propped up in bed, in no apparent

distress.  She continued to complain of abdominal pain.  She apparently was

given Relistor for constipation and she is scheduled for CT angio of the abdomen

and pelvis.  When I saw her this morning, she is complaining of anxiety and

obviously continued abdominal pain.  When I examined her, she looked well and

was clearly in no apparent distress.  Vital signs stable.  The rest of clinical

exam is stable.  The plan is to await the result of CT angio to see if she has

any bowel ischemia.

 



______________________________

EDGAR URENA MD



DR:  KENDALL/maggy  JOB#:  [id number] / [id number]

DD:  01/07/2020 10:57  DT:  01/07/2020 22:35

## 2020-01-07 NOTE — RAD
EXAM: Abdomen, single view.

 

HISTORY: Retained contrast prior to CT angiogram.

 

COMPARISON: 1/3/2020

 

FINDINGS: A frontal view of the abdomen is obtained. There is contrast 

throughout the colon. There are few distal colonic diverticula. There are 

cholecystectomy clips. There is no evidence of bowel obstruction.

 

IMPRESSION: Contrast throughout the entire colon.

 

Electronically signed by: Shae Carlson MD (1/7/2020 5:01 PM) [facility]

## 2020-01-07 NOTE — PDOC
Subjective:


Subjective:


Feels the same.


Is NPO today - ?for possible CTA


Not much results w/ enema, Dulcolax, and Miralax - maybe passed some white 

stuff.


Ate dinner last night - no change in abd pain.


At home when pain was intermittent, might have had pain after eating sometimes 

but not always.  This pain has been constant since 12/31/19 - basically 

unchanged w/ eating and stooling, might be worse after moving around a lot.





Objective:


Objective:


Nurse present.


Vital Signs:





                                   Vital Signs








  Date Time  Temp Pulse Resp B/P (MAP) Pulse Ox O2 Delivery O2 Flow Rate FiO2


 


1/7/20 08:00 97.9 75 18 125/62 (83) 95 Room Air  





 97.9       











PE:





GEN: NAD


LUNGS: CTAB


HEART: RRR


ABD: soft, diffusely tender


NEURO/PSYCH: A & O 3





A/P:


Abd pain, anorexia


Low Cortisol - no change on steroids


?SMA stenosis





--


Try Relistor, await x-ray and possible CTA.  If no CTA today, okay to eat per 

GI.





****


Nurse called this afternoon - steroids stopped, received Relistor, no stool, has

not had KUB, feels worse and wants more pain medication.


She will call for KUB (as recommended yesterday by radiology).  Increase 

Miralax, add Amitiza.


Other per Dr. Woodard.





Hemodynamically unstable?:  No


Is patient in severe pain?:  Yes


Is NPO status required?:  No











JEWEL OSORIO          Jan 7, 2020 09:42

## 2020-01-08 VITALS — SYSTOLIC BLOOD PRESSURE: 142 MMHG | DIASTOLIC BLOOD PRESSURE: 71 MMHG

## 2020-01-08 VITALS — SYSTOLIC BLOOD PRESSURE: 137 MMHG | DIASTOLIC BLOOD PRESSURE: 73 MMHG

## 2020-01-08 VITALS — DIASTOLIC BLOOD PRESSURE: 74 MMHG | SYSTOLIC BLOOD PRESSURE: 137 MMHG

## 2020-01-08 VITALS — DIASTOLIC BLOOD PRESSURE: 54 MMHG | SYSTOLIC BLOOD PRESSURE: 125 MMHG

## 2020-01-08 VITALS — SYSTOLIC BLOOD PRESSURE: 138 MMHG | DIASTOLIC BLOOD PRESSURE: 79 MMHG

## 2020-01-08 VITALS — SYSTOLIC BLOOD PRESSURE: 147 MMHG | DIASTOLIC BLOOD PRESSURE: 93 MMHG

## 2020-01-08 RX ADMIN — PANTOPRAZOLE SODIUM SCH MG: 40 TABLET, DELAYED RELEASE ORAL at 12:27

## 2020-01-08 RX ADMIN — FENTANYL CITRATE PRN MCG: 50 INJECTION INTRAMUSCULAR; INTRAVENOUS at 09:54

## 2020-01-08 RX ADMIN — FENTANYL CITRATE PRN MCG: 50 INJECTION INTRAMUSCULAR; INTRAVENOUS at 06:05

## 2020-01-08 RX ADMIN — BACITRACIN SCH MLS/HR: 5000 INJECTION, POWDER, FOR SOLUTION INTRAMUSCULAR at 05:57

## 2020-01-08 RX ADMIN — TRAZODONE HYDROCHLORIDE SCH MG: 50 TABLET ORAL at 20:29

## 2020-01-08 RX ADMIN — Medication SCH EA: at 20:28

## 2020-01-08 RX ADMIN — LUBIPROSTONE SCH MCG: 24 CAPSULE, GELATIN COATED ORAL at 08:00

## 2020-01-08 RX ADMIN — FENTANYL CITRATE PRN MCG: 50 INJECTION INTRAMUSCULAR; INTRAVENOUS at 20:30

## 2020-01-08 RX ADMIN — FENTANYL CITRATE PRN MCG: 50 INJECTION INTRAMUSCULAR; INTRAVENOUS at 16:25

## 2020-01-08 RX ADMIN — VERAPAMIL HYDROCHLORIDE SCH MG: 120 TABLET, FILM COATED, EXTENDED RELEASE ORAL at 20:29

## 2020-01-08 RX ADMIN — BACITRACIN SCH MLS/HR: 5000 INJECTION, POWDER, FOR SOLUTION INTRAMUSCULAR at 16:30

## 2020-01-08 RX ADMIN — PRAMIPEXOLE DIHYDROCHLORIDE SCH MG: 0.25 TABLET ORAL at 12:28

## 2020-01-08 RX ADMIN — FENTANYL CITRATE PRN MCG: 50 INJECTION INTRAMUSCULAR; INTRAVENOUS at 00:45

## 2020-01-08 RX ADMIN — VERAPAMIL HYDROCHLORIDE SCH MG: 120 TABLET, FILM COATED, EXTENDED RELEASE ORAL at 12:27

## 2020-01-08 RX ADMIN — LOSARTAN POTASSIUM SCH MG: 50 TABLET ORAL at 12:28

## 2020-01-08 RX ADMIN — POLYETHYLENE GLYCOL 3350 SCH GM: 17 POWDER, FOR SOLUTION ORAL at 09:00

## 2020-01-08 NOTE — RAD
EXAM: CT Abdomen and Pelvis with IV contrast

 

CLINICAL HISTORY: abd pain, ?SMA stenosis on US.

 

COMPARISON: Ultrasound 1/6/2020

 

TECHNIQUE: Helical CT of the abdomen and pelvis was performed following 

the administration of IV contrast. Axial, coronal and sagittal reformatted

images were generated. 3-D/MIP reconstructions were generated.

 

---PQRS compliance statement - One or more of the following individualized

dose reduction techniques were utilized for this study:

1.  Automated exposure control

2.  Adjustment of the mA and/or kV according to patient size

3.  Use of iterative reconstruction technique---

 

FINDINGS:

Angiogram: Aorta is normal in caliber throughout without evidence for 

dissection or aneurysmal dilatation. The origin of the celiac trunk is 

widely patent. The origin of the SMA is also widely patent. The proximal 

visualized TIBURCIO is also grossly patent The TIBURCIO is also grossly patent. 

Single bilateral renal arteries are patent, normal in caliber. The common,

internal and external iliac arteries are widely patent, normal in caliber.

 

 

Lower chest: Linear and bandlike densities left lower lobe and lingula 

likely scarring/atelectasis. 

 

Abdomen and pelvis:

Liver and biliary system: Hepatic hypoattenuation may be related to phase 

of contrast or fatty liver. No focal liver lesion.  Accounting for 

cholecystectomy change no biliary ductal dilatation.

 

Spleen: Unremarkable

 

Pancreas: Unremarkable

 

Adrenal glands: Unremarkable

 

Kidneys: Nonobstructing right lower pole renal calculus. Symmetric 

nephrograms. No focal renal lesion. No hydronephrosis or hydroureter.

 

Lymph nodes/retroperitoneum: No abdominal or pelvic lymphadenopathy. No 

abdominal or pelvic ascites.

 

Vessels: The arterial phase of contrast doesn't permit for adequate 

evaluation of the venous structures. Please see angiographic details 

above.

 

Bowel/Peritoneal cavity: Moderate colonic stool content is seen. Appendix 

is not convincingly seen. No small or large bowel dilatation. No evidence 

of bowel obstruction.  There has been a hysterectomy.  

 

Retroperitoneum/Abdominal wall: Small fat-containing periumbilical hernia 

is seen. No retroperitoneal lymphadenopathy. No abdominal or pelvic 

lymphadenopathy.

 

Bladder: Bladder is unremarkable.

 

Bones: Degenerative changes of the hip joints. Multifocal degenerative 

changes of spine are seen.

 

IMPRESSION:

1.  The SMA is widely patent. No obvious stenosis or occlusion is seen. 

The etiology of the increased peak systolic velocities on the prior 

ultrasound is unclear, possibly transient spasm.

2.  Moderate colonic stool content. No bowel obstruction.

3.  Nonobstructing right lower pole renal calculus.

 

Electronically signed by: Felix Villalobos MD (1/8/2020 4:43 PM) [facility]

## 2020-01-08 NOTE — NUR
pt c/o feeling anxious and shaky.  states she thinks she is allergic to lortab.  offered to 
get order for non-narcotic pain med such as toradol.  pt refused stating it never helps.  
call placed to Dr. Silverman and no answer.  unable to leave voicemail.

## 2020-01-08 NOTE — NUR
pt called out to inform staff that she had chipped a tooth on food.  pt was eating grilled 
cheese. pt slightly lethargic, but able to answer questions.  daughter expressed concern 
regarding this and RN told them her fentanyl dose might need to be adjusted. pt also 
informed RN that her toenails had started falling off this visit.  pg to Dr. Silverman regarding 
above.  Fent changed to q6.

## 2020-01-08 NOTE — PDOC
Subjective:


Subjective:


Success w/ [employer] - had KUB and CTA today.


Says same amount of pain and has no appetite but apparently ate a whole chicken 

sandwich from [employer].


Family asks about next steps.





Objective:


Objective:


D/w nurse in hallway - pt did not appear to be in as much pain until I walked 

in.


Vital Signs:





                                   Vital Signs








  Date Time  Temp Pulse Resp B/P (MAP) Pulse Ox O2 Delivery O2 Flow Rate FiO2


 


1/8/20 12:28  78  137/73    


 


1/8/20 11:00 98.4  16  98 Room Air  





 98.4       








Labs:


KUB 1/7


IMPRESSION: Contrast throughout the entire colon.





KUB 1/8


pending





CTA A/P 1/8


pending





PE:





GEN: NAD


LUNGS: CTAB


HEART: RRR


ABD:  BS+, soft, tenderness to light palpation throughout - worse to right


NEURO/PSYCH: A & O 3





A/P:


Abd pain


?SMA stenosis





--


Await today's imaging, may need to ask surgery to revisit.


Okay to hold Amitiza and Miralax for now after clean out w/ [employer] - however,

may need continued constipation treatment if to remain on narcotics.





Hemodynamically unstable?:  No


Is patient in severe pain?:  Yes


Is NPO status required?:  No











JEWEL OSORIO          Jan 8, 2020 13:02

## 2020-01-08 NOTE — RAD
Examination: KUB

 

History: Contrast in bowel on previous examinations. Repeat assessment.

 

Comparison/Correlation: 1/7/2020 KUB x-ray exam

 

Findings: Frontal view of the abdomen was obtained with the patient supine

and portable technique. Right upper quadrant surgical clips are present. 

Left lower quadrant surgical clip is present. There is no contrast 

identified within bowel. Bowel gas pattern is normal. Bony structures are 

grossly unremarkable.

 

 

Impression:

No contrast identified within the colon or small intestine.

 

Electronically signed by: Vargas Augustin MD (1/8/2020 2:34 PM) [facility]

## 2020-01-09 VITALS — DIASTOLIC BLOOD PRESSURE: 76 MMHG | SYSTOLIC BLOOD PRESSURE: 142 MMHG

## 2020-01-09 VITALS
DIASTOLIC BLOOD PRESSURE: 92 MMHG | SYSTOLIC BLOOD PRESSURE: 148 MMHG | SYSTOLIC BLOOD PRESSURE: 148 MMHG | DIASTOLIC BLOOD PRESSURE: 92 MMHG

## 2020-01-09 VITALS — SYSTOLIC BLOOD PRESSURE: 120 MMHG | DIASTOLIC BLOOD PRESSURE: 72 MMHG

## 2020-01-09 LAB
ANION GAP SERPL CALC-SCNC: 8 MMOL/L (ref 6–14)
BUN SERPL-MCNC: 8 MG/DL (ref 7–20)
CALCIUM SERPL-MCNC: 8.3 MG/DL (ref 8.5–10.1)
CHLORIDE SERPL-SCNC: 107 MMOL/L (ref 98–107)
CO2 SERPL-SCNC: 27 MMOL/L (ref 21–32)
CREAT SERPL-MCNC: 0.7 MG/DL (ref 0.6–1)
ERYTHROCYTE [DISTWIDTH] IN BLOOD BY AUTOMATED COUNT: 13.9 % (ref 11.5–14.5)
GFR SERPLBLD BASED ON 1.73 SQ M-ARVRAT: 88.6 ML/MIN
GLUCOSE SERPL-MCNC: 84 MG/DL (ref 70–99)
HCT VFR BLD CALC: 37.4 % (ref 36–47)
HGB BLD-MCNC: 12.3 G/DL (ref 12–15.5)
MCH RBC QN AUTO: 29 PG (ref 25–35)
MCHC RBC AUTO-ENTMCNC: 33 G/DL (ref 31–37)
MCV RBC AUTO: 88 FL (ref 79–100)
PLATELET # BLD AUTO: 193 X10^3/UL (ref 140–400)
POTASSIUM SERPL-SCNC: 3.2 MMOL/L (ref 3.5–5.1)
RBC # BLD AUTO: 4.24 X10^6/UL (ref 3.5–5.4)
SODIUM SERPL-SCNC: 142 MMOL/L (ref 136–145)
WBC # BLD AUTO: 10.7 X10^3/UL (ref 4–11)

## 2020-01-09 RX ADMIN — FENTANYL CITRATE PRN MCG: 50 INJECTION INTRAMUSCULAR; INTRAVENOUS at 07:40

## 2020-01-09 RX ADMIN — BACITRACIN SCH MLS/HR: 5000 INJECTION, POWDER, FOR SOLUTION INTRAMUSCULAR at 02:22

## 2020-01-09 RX ADMIN — FENTANYL CITRATE PRN MCG: 50 INJECTION INTRAMUSCULAR; INTRAVENOUS at 02:22

## 2020-01-09 RX ADMIN — LOSARTAN POTASSIUM SCH MG: 50 TABLET ORAL at 09:00

## 2020-01-09 RX ADMIN — VERAPAMIL HYDROCHLORIDE SCH MG: 120 TABLET, FILM COATED, EXTENDED RELEASE ORAL at 09:00

## 2020-01-09 RX ADMIN — FENTANYL CITRATE PRN MCG: 50 INJECTION INTRAMUSCULAR; INTRAVENOUS at 10:49

## 2020-01-09 RX ADMIN — PRAMIPEXOLE DIHYDROCHLORIDE SCH MG: 0.25 TABLET ORAL at 09:03

## 2020-01-09 RX ADMIN — PANTOPRAZOLE SODIUM SCH MG: 40 TABLET, DELAYED RELEASE ORAL at 07:40

## 2020-01-09 RX ADMIN — VERAPAMIL HYDROCHLORIDE SCH MG: 120 TABLET, FILM COATED, EXTENDED RELEASE ORAL at 10:47

## 2020-01-09 RX ADMIN — LOSARTAN POTASSIUM SCH MG: 50 TABLET ORAL at 10:48

## 2020-01-09 NOTE — PDOC
Subjective:


Subjective:


Feels the same, upset.


 present.


Fainted after walking back from restroom, thinks she has a sinus infection.


Wants to leave to see PCP.


Questions about [facility] - confirms steroids were unhelpful.





Objective:


Objective:


Staff says eating very well - food from outside.


Also says h/o fainting at home w/ previous unrevealing workup per .


Vital Signs:





                                   Vital Signs








  Date Time  Temp Pulse Resp B/P (MAP) Pulse Ox O2 Delivery O2 Flow Rate FiO2


 


1/9/20 11:23      Room Air  


 


1/9/20 11:00 98.4 73 18 148/92 (110) 98   





 98.4       








Labs:





Laboratory Tests








Test


 1/9/20


06:00


 


White Blood Count 10.7 x10^3/uL 


 


Red Blood Count 4.24 x10^6/uL 


 


Hemoglobin 12.3 g/dL 


 


Hematocrit 37.4 % 


 


Mean Corpuscular Volume 88 fL 


 


Mean Corpuscular Hemoglobin 29 pg 


 


Mean Corpuscular Hemoglobin


Concent 33 g/dL 





 


Red Cell Distribution Width 13.9 % 


 


Platelet Count 193 x10^3/uL 


 


Sodium Level 142 mmol/L 


 


Potassium Level 3.2 mmol/L 


 


Chloride Level 107 mmol/L 


 


Carbon Dioxide Level 27 mmol/L 


 


Anion Gap 8 


 


Blood Urea Nitrogen 8 mg/dL 


 


Creatinine 0.7 mg/dL 


 


Estimated GFR


(Cockcroft-Gault) 88.6 





 


Glucose Level 84 mg/dL 


 


Calcium Level 8.3 mg/dL 








Imaging:


CTA


IMPRESSION:


1.  The SMA is widely patent. No obvious stenosis or occlusion is seen. The 

etiology of the increased peak systolic velocities on the prior ultrasound is 

unclear, possibly transient spasm.


2.  Moderate colonic stool content. No bowel obstruction.


3.  Nonobstructing right lower pole renal calculus.





PE:





GEN: NAD


NEURO/PSYCH: A & O 3, frustrated





A/P:


Abd pain





--


D/w Dr. Silverman, Dr. Woodard, and Fernanda this morning.


No additional GI recs - consider second opinion at [facility], our office can refer.


They say GUERO was never done - discussed all with nurse - defer this and 

?fainting to primary.





Hemodynamically unstable?:  No


Is patient in severe pain?:  Yes


Is NPO status required?:  No











JEWEL OSORIO          Jan 9, 2020 11:58

## 2020-01-09 NOTE — NUR
Patient's  pressed call light and stated that patient "passed out", upon entering 
room patient was "out" on the bed, a sternal rub brought her back and patient responded and 
answered orientation questions correctly. Patient's  stated that she does this 
sometimes and has been checked out and they haven't found anything that is suspect cause. 
Patient's blood pressure was basically unchanged from last pressure taken at 1100. Dr. Silverman 
notified of findings.

## 2020-01-09 NOTE — PDOC
SURGICAL PROGRESS NOTE


Subjective


still with pain 


asked to re-eval


some nausea


Vital Signs





Vital Signs








  Date Time  Temp Pulse Resp B/P (MAP) Pulse Ox O2 Delivery O2 Flow Rate FiO2


 


1/9/20 11:23      Room Air  


 


1/9/20 11:00 98.4 73 18 148/92 (110) 98   





 98.4       








I&O











Intake and Output 


 


 1/9/20





 07:00


 


Intake Total 1060 ml


 


Balance 1060 ml


 


 


 


Intake Oral 1060 ml


 


# Voids 3








General:  Alert, Oriented X3, Cooperative


Abdomen:  Soft, Other (ttp diffuse )


Labs





Laboratory Tests








Test


 1/9/20


06:00


 


White Blood Count


 10.7 x10^3/uL


(4.0-11.0)


 


Red Blood Count


 4.24 x10^6/uL


(3.50-5.40)


 


Hemoglobin


 12.3 g/dL


(12.0-15.5)


 


Hematocrit


 37.4 %


(36.0-47.0)


 


Mean Corpuscular Volume 88 fL () 


 


Mean Corpuscular Hemoglobin 29 pg (25-35) 


 


Mean Corpuscular Hemoglobin


Concent 33 g/dL


(31-37)


 


Red Cell Distribution Width


 13.9 %


(11.5-14.5)


 


Platelet Count


 193 x10^3/uL


(140-400)


 


Sodium Level


 142 mmol/L


(136-145)


 


Potassium Level


 3.2 mmol/L


(3.5-5.1)


 


Chloride Level


 107 mmol/L


()


 


Carbon Dioxide Level


 27 mmol/L


(21-32)


 


Anion Gap 8 (6-14) 


 


Blood Urea Nitrogen 8 mg/dL (7-20) 


 


Creatinine


 0.7 mg/dL


(0.6-1.0)


 


Estimated GFR


(Cockcroft-Gault) 88.6 





 


Glucose Level


 84 mg/dL


(70-99)


 


Calcium Level


 8.3 mg/dL


(8.5-10.1)








Laboratory Tests








Test


 1/9/20


06:00


 


White Blood Count


 10.7 x10^3/uL


(4.0-11.0)


 


Red Blood Count


 4.24 x10^6/uL


(3.50-5.40)


 


Hemoglobin


 12.3 g/dL


(12.0-15.5)


 


Hematocrit


 37.4 %


(36.0-47.0)


 


Mean Corpuscular Volume 88 fL () 


 


Mean Corpuscular Hemoglobin 29 pg (25-35) 


 


Mean Corpuscular Hemoglobin


Concent 33 g/dL


(31-37)


 


Red Cell Distribution Width


 13.9 %


(11.5-14.5)


 


Platelet Count


 193 x10^3/uL


(140-400)


 


Sodium Level


 142 mmol/L


(136-145)


 


Potassium Level


 3.2 mmol/L


(3.5-5.1)


 


Chloride Level


 107 mmol/L


()


 


Carbon Dioxide Level


 27 mmol/L


(21-32)


 


Anion Gap 8 (6-14) 


 


Blood Urea Nitrogen 8 mg/dL (7-20) 


 


Creatinine


 0.7 mg/dL


(0.6-1.0)


 


Estimated GFR


(Cockcroft-Gault) 88.6 





 


Glucose Level


 84 mg/dL


(70-99)


 


Calcium Level


 8.3 mg/dL


(8.5-10.1)








Assessment/Plan


reviewed with Dr Aviles, reviewed W/U


no surgical indications, normal imaging 





will sign off











LULU CHAVEZ             Jan 9, 2020 11:40

## 2020-01-09 NOTE — NUR
Discharge Note:



MARIA A RANGEL



Discharge instructions and discharge home medications reviewed with Patient and a copy 
given. All questions have been answered and understanding verbalized. 



The following instructions and handouts were given: discharge instructions, new 
prescriptions, education and follow up recommendations.



Discontinued lines and drains: Peripheral IV discontinued intact.



Patient discharged to Home or Self Care with Spouse via Wheelchair off unit by RN.

## 2020-01-09 NOTE — PN
DATE:  01/08/2020



SUBJECTIVE:  The patient continues to complain of abdominal pain.  She

apparently has had multiple bowel movements yesterday.  She had contrast out the

entire colon.  She apparently is scheduled for CT angio of the abdomen and

pelvis today.



On examining her, she looked well and was clearly in no apparent respiratory

distress.  Her vital signs are stable.



Laboratory work showed that her BUN and creatinine are normal.  Her H and H are

13 and 39 with normal white cell counts and platelets.



So far, all the investigations are unrevealing.  I am waiting for the outcome of

the CT angiography.  She has had bowel ischemia as a reason for her chronic

abdominal pain.  I will repeat her lab work tomorrow to make sure her kidney

function remains good.

 



______________________________

EDGAR URENA MD



DR:  KENDALL/maggy  JOB#:  [id number] / [id number]

DD:  01/08/2020 11:11  DT:  01/08/2020 23:34

## 2021-01-13 ENCOUNTER — HOSPITAL ENCOUNTER (OUTPATIENT)
Dept: HOSPITAL 61 - KCIC MRI | Age: 51
End: 2021-01-13
Attending: PHYSICIAN ASSISTANT
Payer: COMMERCIAL

## 2021-01-13 DIAGNOSIS — M25.561: ICD-10-CM
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
## 2021-01-13 DIAGNOSIS — S89.91XA: Primary | ICD-10-CM

## 2021-01-13 DIAGNOSIS — M25.461: ICD-10-CM
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
## 2021-01-13 DIAGNOSIS — M22.42: ICD-10-CM
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
## 2021-01-13 PROCEDURE — 73721 MRI JNT OF LWR EXTRE W/O DYE: CPT

## 2021-01-13 NOTE — [facility]
MRI STUDY OF THE RIGHT KNEE WITHOUT CONTRAST



CLINICAL INDICATIONS: Right knee injury. Right knee swelling and pain.





TECHNIQUE: T1 and T2 and proton density weighted MRI sequences of the right knee was performed. Image
s were obtained in all 3 planes. 



FINDINGS: The anterior and posterior cruciate ligaments are intact. The quadriceps and patellar tendo
ns are intact. The medial and lateral meniscus are intact. The medial collateral ligament is intact a
nd no meniscocapsular separation is seen. The lateral collateral ligament complex and iliotibial band
 and popliteus tendon are intact. No posterior lateral corner injury is seen. No fracture or marrow i
nfiltrative process is seen. There is moderate to severe chondromalacia with stress reaction bone mar
row edema of the medial femoral condyle. There is mild degenerative spurring of the medial compartmen
t. No focal osteochondral abnormality of the lateral tibiofemoral joint compartment is seen. The little
lla is normally aligned. There is moderate chondromalacia patellae of the medial patellar facet with 
mild subchondral stress reaction bone marrow edema. Mild chondromalacia patellae of the lateral olvera
lar facet is seen. There is moderate chondromalacia of the central trochlear groove with stress react
ion bone marrow edema. The medial and lateral retinacular ligaments are intact. No Baker's cyst is se
en. Moderate-sized knee joint effusion is seen. No loose body is evident.





IMPRESSION: No ligament or tendon or meniscal tear.



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
a.



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




Electronically signed by: Zbigniew Elder MD (1/13/2021 4:05 PM) [place] [id number]